# Patient Record
Sex: FEMALE | Race: WHITE | NOT HISPANIC OR LATINO | Employment: FULL TIME
[De-identification: names, ages, dates, MRNs, and addresses within clinical notes are randomized per-mention and may not be internally consistent; named-entity substitution may affect disease eponyms.]

---
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
## 2019-03-26 ENCOUNTER — OFFICE VISIT (OUTPATIENT)
Dept: FAMILY MEDICINE | Facility: CLINIC | Age: 19
End: 2019-03-26
Payer: COMMERCIAL

## 2019-03-26 VITALS
HEIGHT: 72 IN | WEIGHT: 184 LBS | OXYGEN SATURATION: 99 % | TEMPERATURE: 98.9 F | HEART RATE: 89 BPM | BODY MASS INDEX: 24.92 KG/M2 | DIASTOLIC BLOOD PRESSURE: 69 MMHG | SYSTOLIC BLOOD PRESSURE: 116 MMHG

## 2019-03-26 DIAGNOSIS — R63.5 WEIGHT GAIN: Primary | ICD-10-CM

## 2019-03-26 DIAGNOSIS — R82.90 NONSPECIFIC FINDING ON EXAMINATION OF URINE: ICD-10-CM

## 2019-03-26 DIAGNOSIS — N92.1 IRREGULAR INTERMENSTRUAL BLEEDING: ICD-10-CM

## 2019-03-26 DIAGNOSIS — N92.6 IRREGULAR MENSES: ICD-10-CM

## 2019-03-26 DIAGNOSIS — L70.8 OTHER ACNE: ICD-10-CM

## 2019-03-26 DIAGNOSIS — R31.9 URINARY TRACT INFECTION WITH HEMATURIA, SITE UNSPECIFIED: ICD-10-CM

## 2019-03-26 DIAGNOSIS — N39.0 URINARY TRACT INFECTION WITH HEMATURIA, SITE UNSPECIFIED: ICD-10-CM

## 2019-03-26 DIAGNOSIS — R30.0 DYSURIA: ICD-10-CM

## 2019-03-26 LAB
ALBUMIN UR-MCNC: 100 MG/DL
APPEARANCE UR: ABNORMAL
BACTERIA #/AREA URNS HPF: ABNORMAL /HPF
BILIRUB UR QL STRIP: NEGATIVE
COLOR UR AUTO: YELLOW
GLUCOSE UR STRIP-MCNC: NEGATIVE MG/DL
HGB UR QL STRIP: ABNORMAL
KETONES UR STRIP-MCNC: NEGATIVE MG/DL
LEUKOCYTE ESTERASE UR QL STRIP: ABNORMAL
NITRATE UR QL: POSITIVE
NON-SQ EPI CELLS #/AREA URNS LPF: ABNORMAL /LPF
PH UR STRIP: 6.5 PH (ref 5–7)
RBC #/AREA URNS AUTO: >100 /HPF
SOURCE: ABNORMAL
SP GR UR STRIP: >1.03 (ref 1–1.03)
SPECIMEN SOURCE: NORMAL
UROBILINOGEN UR STRIP-ACNC: 1 EU/DL (ref 0.2–1)
WBC #/AREA URNS AUTO: ABNORMAL /HPF
WET PREP SPEC: NORMAL

## 2019-03-26 PROCEDURE — 87088 URINE BACTERIA CULTURE: CPT | Performed by: FAMILY MEDICINE

## 2019-03-26 PROCEDURE — 81001 URINALYSIS AUTO W/SCOPE: CPT | Performed by: FAMILY MEDICINE

## 2019-03-26 PROCEDURE — 84443 ASSAY THYROID STIM HORMONE: CPT | Performed by: FAMILY MEDICINE

## 2019-03-26 PROCEDURE — 36415 COLL VENOUS BLD VENIPUNCTURE: CPT | Performed by: FAMILY MEDICINE

## 2019-03-26 PROCEDURE — 99214 OFFICE O/P EST MOD 30 MIN: CPT | Performed by: FAMILY MEDICINE

## 2019-03-26 PROCEDURE — 87210 SMEAR WET MOUNT SALINE/INK: CPT | Performed by: FAMILY MEDICINE

## 2019-03-26 PROCEDURE — 84439 ASSAY OF FREE THYROXINE: CPT | Performed by: FAMILY MEDICINE

## 2019-03-26 PROCEDURE — 87186 SC STD MICRODIL/AGAR DIL: CPT | Performed by: FAMILY MEDICINE

## 2019-03-26 PROCEDURE — 87086 URINE CULTURE/COLONY COUNT: CPT | Performed by: FAMILY MEDICINE

## 2019-03-26 RX ORDER — NITROFURANTOIN 25; 75 MG/1; MG/1
100 CAPSULE ORAL ONCE
Status: DISCONTINUED | OUTPATIENT
Start: 2019-03-26 | End: 2019-03-26

## 2019-03-26 RX ORDER — ADAPALENE 0.1 G/100G
CREAM TOPICAL AT BEDTIME
Qty: 45 G | Refills: 1 | Status: SHIPPED | OUTPATIENT
Start: 2019-03-26 | End: 2020-11-19

## 2019-03-26 RX ORDER — CEPHALEXIN 500 MG/1
500 CAPSULE ORAL 2 TIMES DAILY
Qty: 60 CAPSULE | Refills: 3 | Status: SHIPPED | OUTPATIENT
Start: 2019-03-26 | End: 2020-11-19

## 2019-03-26 RX ORDER — NITROFURANTOIN 25; 75 MG/1; MG/1
100 CAPSULE ORAL 2 TIMES DAILY
Qty: 14 CAPSULE | Refills: 0 | Status: SHIPPED | OUTPATIENT
Start: 2019-03-26 | End: 2019-04-02

## 2019-03-26 ASSESSMENT — MIFFLIN-ST. JEOR: SCORE: 1708.68

## 2019-03-26 NOTE — PATIENT INSTRUCTIONS
Take medications as directed.  Treatment and  cares discussed   Follow up if problem or concern

## 2019-03-26 NOTE — PROGRESS NOTES
SUBJECTIVE:   Silvia Cullen is a 19 year old female who presents to clinic today for the following health issues:      Concern - Thyroid, Hormones check and Birth control method         Lab test for thyroid disease because of family hx, check hormones due to weight gain, stretch marks     Currently has nexplanon,  inserted 6/2018 but wondering if symptoms could be related to Nexplanon, she is debating removal., because of constant bleeding  She thinks she has gained weight since, has stretch marks etc   Has issue with acne before but feels like it  may have been worse recently bc of hormonal fluctuation .  Has it mostly face but also  Affects chest back etc . Has been  on other med's and topical stuff, not sure if helped much, so feels frustrated        PROBLEMS TO ADD ON...    UTI - Female  Duration of complaint:  1 week   Description:   Painful urination (Dysuria):  Uncomfortable   Blood in urine (Hematuria): no   Delay in urine (Hesitency): no   Intensity: mild  Progression of Symptoms:  same  Accompanying Signs & Symptoms:  Fever/chills: no   Flank pain no   Nausea and vomiting: no   Any vaginal symptoms: vaginal discharge, vaginal odor and vaginal itching  Abdominal/Pelvic Pain: no   History:   History of frequent UTI's: no   History of kidney stones: no   Sexually Active: YES  Possibility of pregnancy: No  Precipitating factors:   Therapies Tried and outcome: nothing           Problem list and histories reviewed & adjusted, as indicated.  Additional history: as documented    There is no problem list on file for this patient.    No past surgical history on file.    Social History     Tobacco Use     Smoking status: Never Smoker     Smokeless tobacco: Never Used   Substance Use Topics     Alcohol use: No     No family history on file.        Reviewed and updated as needed this visit by clinical staff       Reviewed and updated as needed this visit by Provider         ROS:  Constitutional, HEENT, cardiovascular,  "pulmonary, GI, , musculoskeletal, neuro, skin, endocrine and psych systems are negative, except as otherwise noted.    OBJECTIVE:     /69   Pulse 89   Temp 98.9  F (37.2  C) (Tympanic)   Ht 1.816 m (5' 11.5\")   Wt 83.5 kg (184 lb)   SpO2 99%   Breastfeeding? No   BMI 25.31 kg/m    Body mass index is 25.31 kg/m .  GENERAL: healthy, alert and no distress  HENT: oropharynx clear and oral mucous membranes moist  NECK: no adenopathy, no asymmetry, masses, or scars and thyroid normal to palpation  RESP: lungs clear to auscultation - no rales, rhonchi or wheezes  CV: regular rate and rhythm, normal S1 S2, no S3 or S4, no murmur, click or rub, no peripheral edema and peripheral pulses strong  ABDOMEN: soft, nontender, no hepatosplenomegaly, no masses and bowel sounds normal  MS: no edema  SKIN: has fine  Acne lesion on cheeks, chin , forehead although has make up so not too obvious , als some more inflamed and ecthymatous and few pustular lesion on arms/n upper back and neck     Diagnostic Test Results:  Urinalysis - abnormal     ASSESSMENT/PLAN:       (R63.5) Weight gain  Comment: she thinsk it is related to nexplanon but also worried about her thyroid   Plan: TSH, T4, free              (N92.6) Irregular menses  Comment: related to nexplanon   Plan: she plans to go back to discuss option with her gyn       (R30.0) Dysuria  Comment:   Plan: Wet prep, UA reflex to Microscopic and Culture              (L70.8) Other acne  Comment:   Plan: cephALEXin (KEFLEX) 500 MG capsule, adapalene         (DIFFERIN) 0.1 % external cream, SKIN CARE         REFERRAL             Discussed  cares, concerns and treatment options for acne. Willing to start with topical  Differin also keflex . Pros/ cons of treatment and skin cares discussed. Info  given on acne. Follow up recheck in 6-8 weeks, sooner if problem . May see skin care clinic for any ongoing issue if needed       (R82.90) Nonspecific finding on examination of " urine  Comment:   Plan: Urine Culture Aerobic Bacterial            (N39.0,  R31.9) Urinary tract infection with hematuria, site unspecified  Comment:   Plan: nitroFURantoin macrocrystal-monohydrate           nitroFURantoin macrocrystal-monohydrate         (MACROBID) capsule 100 mg              Check labs. refill sent.Cares and  treatment discussed follow. up if problem   Patient expressed understanding and agreement with treatment plan. All patient's questions were answered, will let me know if has more later.  Medications: Rx's: Reviewed the potential side effects/complications of medications prescribed.       Lauren Cochran MD  [facility] [place]

## 2019-03-27 LAB
T4 FREE SERPL-MCNC: 0.95 NG/DL (ref 0.76–1.46)
TSH SERPL DL<=0.005 MIU/L-ACNC: 1.83 MU/L (ref 0.4–4)

## 2019-03-28 LAB
BACTERIA SPEC CULT: ABNORMAL
BACTERIA SPEC CULT: ABNORMAL
SPECIMEN SOURCE: ABNORMAL

## 2019-05-06 ENCOUNTER — APPOINTMENT (OUTPATIENT)
Dept: LAB | Facility: CLINIC | Age: 19
End: 2019-05-06
Payer: COMMERCIAL

## 2019-08-09 ENCOUNTER — OFFICE VISIT (OUTPATIENT)
Dept: FAMILY MEDICINE | Facility: CLINIC | Age: 19
End: 2019-08-09
Payer: COMMERCIAL

## 2019-08-09 VITALS
OXYGEN SATURATION: 98 % | DIASTOLIC BLOOD PRESSURE: 60 MMHG | SYSTOLIC BLOOD PRESSURE: 110 MMHG | BODY MASS INDEX: 24.62 KG/M2 | WEIGHT: 179 LBS | HEART RATE: 78 BPM | TEMPERATURE: 98.1 F

## 2019-08-09 DIAGNOSIS — R07.0 THROAT PAIN: ICD-10-CM

## 2019-08-09 DIAGNOSIS — J02.0 ACUTE STREPTOCOCCAL PHARYNGITIS: Primary | ICD-10-CM

## 2019-08-09 LAB
DEPRECATED S PYO AG THROAT QL EIA: NORMAL
SPECIMEN SOURCE: NORMAL

## 2019-08-09 PROCEDURE — 99213 OFFICE O/P EST LOW 20 MIN: CPT | Performed by: NURSE PRACTITIONER

## 2019-08-09 PROCEDURE — 87081 CULTURE SCREEN ONLY: CPT | Performed by: NURSE PRACTITIONER

## 2019-08-09 PROCEDURE — 87880 STREP A ASSAY W/OPTIC: CPT | Performed by: NURSE PRACTITIONER

## 2019-08-09 RX ORDER — PENICILLIN V POTASSIUM 500 MG/1
500 TABLET, FILM COATED ORAL 2 TIMES DAILY
Qty: 20 TABLET | Refills: 0 | Status: SHIPPED | OUTPATIENT
Start: 2019-08-09 | End: 2019-08-19

## 2019-08-09 NOTE — PATIENT INSTRUCTIONS
Fill Rx if your recovery fails to progress.     Let me know if you need anything else.    Lozenges, sprays, honey, tea, salt water gargles.     Ditch your toothbrush

## 2019-08-09 NOTE — PROGRESS NOTES
Subjective     Silvia Cullen is a 19 year old female who presents to clinic today for the following health issues:    HPI   Acute Illness   Acute illness concerns: sore throat   Onset: 4 days     Fever: no    Chills/Sweats: YES    Headache (location?): YES    Sinus Pressure:no    Conjunctivitis:  no    Ear Pain: YES: right    Rhinorrhea: no    Congestion: no    Sore Throat: YES     Cough: no    Wheeze: no    Decreased Appetite: no    Nausea: no    Vomiting: no    Diarrhea:  no    Dysuria/Freq.: no    Fatigue/Achiness: YES    Sick/Strep Exposure: YES- mom tested positive for strep yesterday      Therapies Tried and outcome: none     HPI: Silvia presents today with the complaint of sore throat, chills, ear pain, and fatigue for the past four days. Getting slightly better today, she noted. Her mother was diagnosed with strep pharyngitis yesterday.     Patient Active Problem List   Diagnosis     Other acne     History reviewed. No pertinent surgical history.    Social History     Tobacco Use     Smoking status: Never Smoker     Smokeless tobacco: Never Used   Substance Use Topics     Alcohol use: No     History reviewed. No pertinent family history.        Reviewed and updated as needed this visit by Provider  Tobacco  Allergies  Meds  Problems  Med Hx  Surg Hx  Fam Hx         Review of Systems   ROS COMP: Constitutional, HEENT, pulmonary systems are negative, except as otherwise noted.      Objective    /60 (BP Location: Right arm, Patient Position: Chair, Cuff Size: Adult Regular)   Pulse 78   Temp 98.1  F (36.7  C) (Tympanic)   Wt 81.2 kg (179 lb)   LMP 07/19/2019   SpO2 98%   BMI 24.62 kg/m    Body mass index is 24.62 kg/m .  Physical Exam   GENERAL: healthy, alert and no distress  HENT: ear canals and TM's normal, nose without ulcers or lesions; Mouth- markedly edematous and erythematous posterior oropharynx with patchy white exudate noted over back wall and pillars.   NECK: no adenopathy, no  "asymmetry, masses, or scars and thyroid normal to palpation  RESP: lungs clear to auscultation - no rales, rhonchi or wheezes  CV: regular rate and rhythm, normal S1 S2, no S3 or S4, no murmur, click or rub, no peripheral edema and peripheral pulses strong  NEURO: Normal strength and tone, mentation intact and speech normal    Diagnostic Test Results:  Results for orders placed or performed in visit on 08/09/19 (from the past 24 hour(s))   Strep, Rapid Screen   Result Value Ref Range    Specimen Description Throat     Rapid Strep A Screen       NEGATIVE: No Group A streptococcal antigen detected by immunoassay, await culture report.           Assessment & Plan     Silvia was seen today for throat problem. Rapid strep negative. However, given that she and her mother (who has documented strep throat) developed similar symptoms simultaneously (in combination with the appearance of her throat today), I feel like it is reasonable to provide a written prescription for penicillin V to be filled in the event that her symptoms fail to resolve over the weekend. She agrees to hold off on filling this if she continues to improve. Discussed reasons to call or return to clinic. Silvia acknowledges and demonstrates understanding of circumstances under which care should be sought urgently or emergently. Follow up as discussed. Discussed risks, benefits, alternatives, potential side effects, and proper administration of new medication / treatment. Agrees with plan of care. All questions answered.     Diagnoses and all orders for this visit:    Acute streptococcal pharyngitis  -     penicillin V (VEETID) 500 MG tablet; Take 1 tablet (500 mg) by mouth 2 times daily for 10 days    Throat pain  -     Strep, Rapid Screen  -     Beta strep group A culture         BMI:   Estimated body mass index is 24.62 kg/m  as calculated from the following:    Height as of 3/26/19: 1.816 m (5' 11.5\").    Weight as of this encounter: 81.2 kg (179 lb). "           See Patient Instructions    Return in about 10 days (around 8/19/2019).    Jose Francisco Roas NP  [facility] [place]

## 2019-08-10 LAB
BACTERIA SPEC CULT: NORMAL
SPECIMEN SOURCE: NORMAL

## 2020-03-10 ENCOUNTER — HEALTH MAINTENANCE LETTER (OUTPATIENT)
Age: 20
End: 2020-03-10

## 2020-09-29 ENCOUNTER — MYC MEDICAL ADVICE (OUTPATIENT)
Dept: FAMILY MEDICINE | Facility: CLINIC | Age: 20
End: 2020-09-29

## 2020-09-29 ENCOUNTER — OFFICE VISIT (OUTPATIENT)
Dept: FAMILY MEDICINE | Facility: CLINIC | Age: 20
End: 2020-09-29
Payer: COMMERCIAL

## 2020-09-29 VITALS
TEMPERATURE: 97.5 F | DIASTOLIC BLOOD PRESSURE: 60 MMHG | SYSTOLIC BLOOD PRESSURE: 110 MMHG | BODY MASS INDEX: 26.14 KG/M2 | HEIGHT: 72 IN | WEIGHT: 193 LBS | HEART RATE: 72 BPM

## 2020-09-29 DIAGNOSIS — Z00.00 ROUTINE GENERAL MEDICAL EXAMINATION AT A HEALTH CARE FACILITY: Primary | ICD-10-CM

## 2020-09-29 DIAGNOSIS — Z11.1 SCREENING EXAMINATION FOR PULMONARY TUBERCULOSIS: ICD-10-CM

## 2020-09-29 DIAGNOSIS — Z11.8 SCREENING FOR CHLAMYDIAL DISEASE: ICD-10-CM

## 2020-09-29 DIAGNOSIS — Z23 NEED FOR PROPHYLACTIC VACCINATION AND INOCULATION AGAINST INFLUENZA: ICD-10-CM

## 2020-09-29 DIAGNOSIS — Z01.84 IMMUNITY STATUS TESTING: ICD-10-CM

## 2020-09-29 PROCEDURE — 86787 VARICELLA-ZOSTER ANTIBODY: CPT | Performed by: NURSE PRACTITIONER

## 2020-09-29 PROCEDURE — 87491 CHLMYD TRACH DNA AMP PROBE: CPT | Performed by: NURSE PRACTITIONER

## 2020-09-29 PROCEDURE — 86481 TB AG RESPONSE T-CELL SUSP: CPT | Performed by: NURSE PRACTITIONER

## 2020-09-29 PROCEDURE — 99395 PREV VISIT EST AGE 18-39: CPT | Mod: 25 | Performed by: NURSE PRACTITIONER

## 2020-09-29 PROCEDURE — 36415 COLL VENOUS BLD VENIPUNCTURE: CPT | Performed by: NURSE PRACTITIONER

## 2020-09-29 PROCEDURE — 90686 IIV4 VACC NO PRSV 0.5 ML IM: CPT | Performed by: NURSE PRACTITIONER

## 2020-09-29 PROCEDURE — 90471 IMMUNIZATION ADMIN: CPT | Performed by: NURSE PRACTITIONER

## 2020-09-29 RX ORDER — ETONOGESTREL AND ETHINYL ESTRADIOL .12; .015 MG/D; MG/D
RING VAGINAL
COMMUNITY
Start: 2020-08-26

## 2020-09-29 ASSESSMENT — MIFFLIN-ST. JEOR: SCORE: 1752.44

## 2020-09-29 NOTE — TELEPHONE ENCOUNTER
Printed and placed in MA folder to add to chart.    .Ritika SILVA    [facility] [place]

## 2020-09-29 NOTE — PATIENT INSTRUCTIONS
Preventive Health Recommendations  Female Ages 21 to 25     Yearly exam:     See your health care provider every year in order to  o Review health changes.   o Discuss preventive care.    o Review your medicines if your doctor has prescribed any.      You should be tested each year for STDs (sexually transmitted diseases).       Talk to your provider about how often you should have cholesterol testing.      Get a Pap test every three years. If you have an abnormal result, your doctor may have you test more often.      If you are at risk for diabetes, you should have a diabetes test (fasting glucose).     Shots:     Get a flu shot each year.     Get a tetanus shot every 10 years.     Consider getting the shot (vaccine) that prevents cervical cancer (Gardasil).    Nutrition:     Eat at least 5 servings of fruits and vegetables each day.    Eat whole-grain bread, whole-wheat pasta and brown rice instead of white grains and rice.    Get adequate Calcium and Vitamin D.     Lifestyle    Exercise at least 150 minutes a week each week (30 minutes a day, 5 days a week). This will help you control your weight and prevent disease.    Limit alcohol to one drink per day.    No smoking.     Wear sunscreen to prevent skin cancer.    See your dentist every six months for an exam and cleaning.        https://www.ibsdiets.org/fodmap-diet/fodmap-food-list/

## 2020-09-29 NOTE — TELEPHONE ENCOUNTER
Printed and placed in provider in-basket.    .Ritika SILVA    [facility] [place]

## 2020-09-29 NOTE — PROGRESS NOTES
SUBJECTIVE:   CC: Silvia Cullen is an 20 year old woman who presents for preventive health visit.       Patient has been advised of split billing requirements and indicates understanding: Yes  Healthy Habits:    Do you get at least three servings of calcium containing foods daily (dairy, green leafy vegetables, etc.)? yes    Amount of exercise or daily activities, outside of work: 3 day(s) per week    Problems taking medications regularly No    Medication side effects: No    Have you had an eye exam in the past two years? yes    Do you see a dentist twice per year? yes    Do you have sleep apnea, excessive snoring or daytime drowsiness?no      PROBLEMS TO ADD ON... mantoux test (blood test b/c she had the vaccine) , varicella antibodies and flu     Today's PHQ-2 Score:   PHQ-2 ( 1999 Pfizer) 9/29/2020   Q1: Little interest or pleasure in doing things 0   Q2: Feeling down, depressed or hopeless 0   PHQ-2 Score 0       Abuse: Current or Past(Physical, Sexual or Emotional)- NO  Do you feel safe in your environment? YES        Social History     Tobacco Use     Smoking status: Never Smoker     Smokeless tobacco: Never Used   Substance Use Topics     Alcohol use: Yes     If you drink alcohol do you typically have >3 drinks per day or >7 drinks per week? No                     Reviewed orders with patient.  Reviewed health maintenance and updated orders accordingly - Yes  Lab work is in process    Mammogram not appropriate for this patient based on age.    Pertinent mammograms are reviewed under the imaging tab.  History of abnormal Pap smear: NO - age 21-29 PAP every 3 years recommended     Reviewed and updated as needed this visit by clinical staff  Tobacco  Allergies  Meds  Problems  Med Hx  Surg Hx  Fam Hx  Soc Hx          Reviewed and updated as needed this visit by Provider  Tobacco  Allergies  Meds  Problems  Med Hx  Surg Hx  Fam Hx            ROS:  CONSTITUTIONAL: NEGATIVE for fever, chills, change  in weight  INTEGUMENTARU/SKIN: NEGATIVE for worrisome rashes, moles or lesions  EYES: NEGATIVE for vision changes or irritation  ENT: NEGATIVE for ear, mouth and throat problems  RESP: NEGATIVE for significant cough or SOB  BREAST: NEGATIVE for masses, tenderness or discharge  CV: NEGATIVE for chest pain, palpitations or peripheral edema  GI: NEGATIVE for nausea, abdominal pain, heartburn, or change in bowel habits  : NEGATIVE for unusual urinary or vaginal symptoms. Periods are regular.  MUSCULOSKELETAL: NEGATIVE for significant arthralgias or myalgia  NEURO: NEGATIVE for weakness, dizziness or paresthesias  PSYCHIATRIC: NEGATIVE for changes in mood or affect    OBJECTIVE:   /60 (BP Location: Right arm, Patient Position: Chair, Cuff Size: Adult Regular)   Pulse 72   Temp 97.5  F (36.4  C) (Tympanic)   Ht 1.829 m (6')   Wt 87.5 kg (193 lb)   LMP 09/21/2020   BMI 26.18 kg/m    EXAM:  GENERAL: healthy, alert and no distress  EYES: Eyes grossly normal to inspection, PERRL and conjunctivae and sclerae normal  HENT: ear canals and TM's normal, nose and mouth without ulcers or lesions  NECK: no adenopathy, no asymmetry, masses, or scars and thyroid normal to palpation  RESP: lungs clear to auscultation - no rales, rhonchi or wheezes  BREAST: normal without masses, tenderness or nipple discharge and no palpable axillary masses or adenopathy  CV: regular rate and rhythm, normal S1 S2, no S3 or S4, no murmur, click or rub, no peripheral edema and peripheral pulses strong  ABDOMEN: soft, nontender, no hepatosplenomegaly, no masses and bowel sounds normal  MS: no gross musculoskeletal defects noted, no edema  SKIN: no suspicious lesions or rashes  NEURO: Normal strength and tone, mentation intact and speech normal  PSYCH: mentation appears normal, affect normal/bright    Diagnostic Test Results:  Labs reviewed in Epic    ASSESSMENT/PLAN:   Silvia was seen today for physical and imm/inj.    Diagnoses and all orders  for this visit:    Routine general medical examination at a health care facility    Screening examination for pulmonary tuberculosis  -     Quantiferon TB Gold Plus    Immunity status testing  -     Varicella Zoster Virus Antibody IgG    Screening for chlamydial disease  -     CHLAMYDIA TRACHOMATIS PCR    Need for prophylactic vaccination and inoculation against influenza  -     INFLUENZA VACCINE IM > 6 MONTHS VALENT IIV4 [08237]  -     Vaccine Administration, Initial [[id number]]        Patient has been advised of split billing requirements and indicates understanding: Yes  COUNSELING:   Reviewed preventive health counseling, as reflected in patient instructions    Estimated body mass index is 26.18 kg/m  as calculated from the following:    Height as of this encounter: 1.829 m (6').    Weight as of this encounter: 87.5 kg (193 lb).    Weight management plan: Discussed healthy diet and exercise guidelines    She reports that she has never smoked. She has never used smokeless tobacco.      Counseling Resources:  ATP IV Guidelines  Pooled Cohorts Equation Calculator  Breast Cancer Risk Calculator  BRCA-Related Cancer Risk Assessment: FHS-7 Tool  FRAX Risk Assessment  ICSI Preventive Guidelines  Dietary Guidelines for Americans, 2010  USDA's MyPlate  ASA Prophylaxis  Lung CA Screening    Jose Francisco Rosa NP  [facility] [place]

## 2020-09-30 LAB
C TRACH DNA SPEC QL NAA+PROBE: NEGATIVE
GAMMA INTERFERON BACKGROUND BLD IA-ACNC: 0.03 IU/ML
M TB IFN-G CD4+ BCKGRND COR BLD-ACNC: 9.97 IU/ML
M TB TUBERC IFN-G BLD QL: NEGATIVE
MITOGEN IGNF BCKGRD COR BLD-ACNC: 0 IU/ML
MITOGEN IGNF BCKGRD COR BLD-ACNC: 0 IU/ML
SPECIMEN SOURCE: NORMAL
VZV IGG SER QL IA: 2.9 AI (ref 0–0.8)

## 2020-09-30 NOTE — TELEPHONE ENCOUNTER
Brought to the  for patient to , per patient request. Mychart sent to patient.    .Ritika SILVA    [facility]

## 2020-09-30 NOTE — TELEPHONE ENCOUNTER
Form has been completed, immunizations have been abstracted into chart and scanned to medical records. Silent Communicationhart sent to patient.    .Ritika SILVA    [facility] [place]

## 2020-11-19 ENCOUNTER — OFFICE VISIT (OUTPATIENT)
Dept: FAMILY MEDICINE | Facility: CLINIC | Age: 20
End: 2020-11-19
Payer: COMMERCIAL

## 2020-11-19 VITALS
HEIGHT: 72 IN | BODY MASS INDEX: 26.41 KG/M2 | OXYGEN SATURATION: 100 % | WEIGHT: 195 LBS | TEMPERATURE: 97.6 F | DIASTOLIC BLOOD PRESSURE: 76 MMHG | SYSTOLIC BLOOD PRESSURE: 110 MMHG | HEART RATE: 89 BPM

## 2020-11-19 DIAGNOSIS — R19.8 ALTERNATING CONSTIPATION AND DIARRHEA: ICD-10-CM

## 2020-11-19 DIAGNOSIS — R19.8 GASTROINTESTINAL SYMPTOMS: Primary | ICD-10-CM

## 2020-11-19 PROCEDURE — 36415 COLL VENOUS BLD VENIPUNCTURE: CPT | Performed by: NURSE PRACTITIONER

## 2020-11-19 PROCEDURE — 82784 ASSAY IGA/IGD/IGG/IGM EACH: CPT | Performed by: NURSE PRACTITIONER

## 2020-11-19 PROCEDURE — 83516 IMMUNOASSAY NONANTIBODY: CPT | Mod: 59 | Performed by: NURSE PRACTITIONER

## 2020-11-19 PROCEDURE — 82785 ASSAY OF IGE: CPT | Performed by: NURSE PRACTITIONER

## 2020-11-19 PROCEDURE — 83516 IMMUNOASSAY NONANTIBODY: CPT | Performed by: NURSE PRACTITIONER

## 2020-11-19 PROCEDURE — 99214 OFFICE O/P EST MOD 30 MIN: CPT | Performed by: NURSE PRACTITIONER

## 2020-11-19 PROCEDURE — 86003 ALLG SPEC IGE CRUDE XTRC EA: CPT | Performed by: NURSE PRACTITIONER

## 2020-11-19 ASSESSMENT — MIFFLIN-ST. JEOR: SCORE: 1761.51

## 2020-11-19 NOTE — PROGRESS NOTES
Subjective     Silvia Cullen is a 20 year old female who presents to clinic today for the following health issues:    HPI         GI problem   Onset/Duration: x ongoing   Description: diarrhea and or constipation   Intensity: mild, moderate  Progression of Symptoms: improving with recommendation   Accompanying Signs & Symptoms:  constipation: YES  Diarrhea:    Yes   Nausea: no  Vomiting (bloody?): no  Abdominal Pain: YES  Black-Tarry stools: no  Bloody stools: no  History:  Previous similar episodes: YES  Previous ulcers: no  Precipitating factors:   Caffeine use: YES  Alcohol use: no  NSAID/Aspirin use: no.  Alleviating factors: None  Therapies tried and outcome:             Lifestyle changes: yes            Medications: none    HPI: At her last physical, Silvia related that she had long-struggled with abdominal bloating, abdominal pain, and alternating diarrhea and constipation. Her mother told her that she had struggled with these issues even in early childhood (hives and other symptoms when given certain baby foods, etc.). At her physical, we discussed potential IBS and she attempted to modify her diet. She does note some improvement, but she finds that she still develops her worst symptoms after eating pastas, breads, etc. No upper GI complaints. No urinary complaints. No hematochezia or melena.     Review of Systems   Constitutional, GI,  systems are negative, except as otherwise noted.      Objective    /76   Pulse 89   Temp 97.6  F (36.4  C) (Tympanic)   Ht 1.829 m (6')   Wt 88.5 kg (195 lb)   LMP 10/19/2020   SpO2 100%   BMI 26.45 kg/m    Body mass index is 26.45 kg/m .  Physical Exam   GENERAL: healthy, alert and no distress  RESP: lungs clear to auscultation - no rales, rhonchi or wheezes  CV: regular rate and rhythm, normal S1 S2, no S3 or S4, no murmur, click or rub, no peripheral edema and peripheral pulses strong  ABDOMEN: soft, nontender, no hepatosplenomegaly, no masses and bowel sounds  normal  NEURO: Normal strength and tone, mentation intact and speech normal  PSYCH: mentation appears normal, affect normal/bright    No results found for this or any previous visit (from the past 24 hour(s)).        Assessment & Plan     Silvia was seen today for gastrointestinal problem. Possibly an organic etiology given failure of low fodmap diet and failure of elimination diets. Will check for Celiac today, as well as common food allergies. Will follow up with her after I see the results. Will send to GI if these are unremarkable. Will send to allergist if food allergy panel indicates multiple allergies.    Diagnoses and all orders for this visit:    Gastrointestinal symptoms  -     Tissue transglutaminase french IgA and IgG  -     IgA  -     Allergy adult food panel    Alternating constipation and diarrhea  -     Tissue transglutaminase french IgA and IgG  -     IgA  -     Allergy adult food panel            See Patient Instructions    Return in about 4 weeks (around 12/17/2020) for persistent or worsening symptoms.    Jose Francisco Rosa NP  [facility]

## 2020-11-20 LAB — IGA SERPL-MCNC: 179 MG/DL (ref 84–499)

## 2020-11-24 LAB
ALMOND IGE QN: <0.1 KU(A)/L
CASHEW NUT IGE QN: <0.1 KU(A)/L
CODFISH IGE QN: <0.1 KU(A)/L
COW MILK IGE QN: <0.1 KU(A)/L
EGG WHITE IGE QN: <0.1 KU(A)/L
HAZELNUT IGE QN: <0.1 KU(A)/L
IGE SERPL-ACNC: 19 KIU/L (ref 0–114)
PEANUT IGE QN: <0.1 KU(A)/L
SALMON IGE QN: <0.1 KU(A)/L
SCALLOP IGE QN: <0.1 KU(A)/L
SESAME SEED IGE QN: <0.1 KU(A)/L
SHRIMP IGE QN: <0.1 KU(A)/L
SOYBEAN IGE QN: <0.1 KU(A)/L
TTG IGA SER-ACNC: 1 U/ML
TTG IGG SER-ACNC: <1 U/ML
TUNA IGE QN: <0.1 KU(A)/L
WALNUT IGE QN: <0.1 KU(A)/L
WHEAT IGE QN: <0.1 KU(A)/L

## 2020-11-27 NOTE — TELEPHONE ENCOUNTER
REFERRAL INFORMATION:    Referring Provider:  Dr. Jose Francisco Rosa     Referring Clinic:  [facility]     Reason for Visit/Diagnosis: Constipation and Diarrhea      FUTURE VISIT INFORMATION:    Appointment Date: 12/22/2020    Appointment Time: 1:20 PM     NOTES STATUS DETAILS   OFFICE NOTE from Referring Provider Internal 11/19/2020 Office visit with Dr. Rosa    OFFICE NOTE from Other Specialist N/A    HOSPITAL DISCHARGE SUMMARY/  ED VISITS N/A    OPERATIVE REPORT N/A    MEDICATION LIST Care Everywhere         ENDOSCOPY  N/A    COLONOSCOPY N/A    ERCP N/A    EUS N/A    STOOL TESTING N/A    PERTINENT LABS Internal    PATHOLOGY REPORTS (RELATED) N/A    IMAGING (CT, MRI, EGD, MRCP, Small Bowel Follow Through/SBT, MR/CT Enterography) N/A

## 2020-12-22 ENCOUNTER — VIRTUAL VISIT (OUTPATIENT)
Dept: GASTROENTEROLOGY | Facility: CLINIC | Age: 20
End: 2020-12-22
Payer: COMMERCIAL

## 2020-12-22 ENCOUNTER — PRE VISIT (OUTPATIENT)
Dept: GASTROENTEROLOGY | Facility: CLINIC | Age: 20
End: 2020-12-22

## 2020-12-22 VITALS — HEIGHT: 72 IN | WEIGHT: 195 LBS | BODY MASS INDEX: 26.41 KG/M2

## 2020-12-22 DIAGNOSIS — K58.1 IRRITABLE BOWEL SYNDROME WITH CONSTIPATION: Primary | ICD-10-CM

## 2020-12-22 PROCEDURE — 99203 OFFICE O/P NEW LOW 30 MIN: CPT | Mod: 95 | Performed by: INTERNAL MEDICINE

## 2020-12-22 ASSESSMENT — MIFFLIN-ST. JEOR: SCORE: 1761.51

## 2020-12-22 ASSESSMENT — PAIN SCALES - GENERAL: PAINLEVEL: NO PAIN (0)

## 2020-12-22 NOTE — LETTER
"  12/22/2020      RE: Silvia Cullen  [address]  [place] [address]      Dear Colleague,    Thank you for referring your patient, Silvia Cullen, to the [facility] [place]. Please see a copy of my visit note below.    Silvia Cullen is a 20 year old female who is being evaluated via a billable video visit.      The patient has been notified of following:     \"This video visit will be conducted via a call between you and your physician/provider. We have found that certain health care needs can be provided without the need for an in-person physical exam.  This service lets us provide the care you need with a video conversation.  If a prescription is necessary we can send it directly to your pharmacy.  If lab work is needed we can place an order for that and you can then stop by our lab to have the test done at a later time.    Video visits are billed at different rates depending on your insurance coverage.  Please reach out to your insurance provider with any questions.    If during the course of the call the physician/provider feels a video visit is not appropriate, you will not be charged for this service.\"    Patient has given verbal consent for Video visit? Yes  How would you like to obtain your AVS? MyChart  If you are dropped from the video visit, the video invite should be resent to: Text to cell phone: [phone]  Will anyone else be joining your video visit? No      Referring provider: Jose Francisco Rosa NP    CC: 20 year old female referred by Jose Francisco Rosa NP for evaluation of bloating and constipation.    HPI:   21 yo woman who presents for bloating and constipation.     Can go 3 days without a BM, then has 1 BM. Milk and cheese will cause diarrhea. Bloating improves with defecation. Gets abdominal discomfort at umbilicus when bloated. Has not taken anything for constipation. Occasionally uses probiotics. Weight stable. No blood or melena. Occasional nausea. No " vomiting. Symptoms present for years, although changed about 6-12 months ago- used to have more nausea and vomiting, now more issues with constipation then diarrhea.    A 10 point ROS is otherwise negative.    PMH:  Previously    PSH:  No abdominal surgeries.    Family History   Problem Relation Age of Onset     Crohn's Disease No family hx of      Ulcerative Colitis No family hx of      GERD No family hx of      Stomach Cancer No family hx of    No colon cancer.    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     Current Outpatient Medications   Medication     ELURYNG 0.12-0.015 MG/24HR vaginal ring     No current facility-administered medications for this visit.    Occasional probiotics. No other supplements or OTC.     Physical exam:  GEN: NAD  Eyes: EOMI  Ears: hearing intact  Mouth: MMM  Neck: full ROM  Cardiopulmonary: non labored  Skin: no jaundice  Neuro: awake and oriented  MSK: moves arms equally  Psych: normal affect     Labs:   TTG negative    Imaging:   No pertinent imaging.     Endoscopy:  No history of endoscopy.    Assessment and recommendations:   21 yo woman who presents for bloating and constipation.     Consistent with irritable bowel syndrome- constipation > mixed.   No red flag signs or symptoms. Bloating and discomfort improves with defecation.     - go to lab  - start polyethylene glycol (Miralax) 17g per day  - consider trying enteric coated peppermint oil (IBGard) 1 capsule once per day x2 weeks- if helpful you can take this long term  - consider trying psyllium fiber (Metamucil) one tsp per day    RTC 3 months      Video-Visit Details    Type of service:  Video Visit    Video Start Time: 1:41 PM  Video End Time: 1:56 PM    Originating Location (pt. Location): Home    Distant Location (provider location):  M  [facility] [place]     Platform used for Video Visit: Dena Adair MD    [facility]  Division of Gastroenterology  [phone]

## 2020-12-22 NOTE — PATIENT INSTRUCTIONS
It was a pleasure taking care of you today.  I've included a brief summary of our discussion and care plan from today's visit below.  Please review this information with your primary care provider.  _______________________________________________________________________    My recommendations are summarized as follows:  - go to lab  - start polyethylene glycol (Miralax) 17g per day  - consider trying enteric coated peppermint oil (IBGard) 1 capsule once per day x2 weeks- if helpful you can take this long term  - consider trying psyllium fiber (Metamucil) one tsp per day      Please call our nurse Patricia with any questions or concerns- [phone].  --    Return to GI Clinic in 3 months to review your progress.    _______________________________________________________________________    Who do I call with any questions after my visit?  Please be in touch if there are any further questions that arise following today's visit.  There are multiple ways to contact your gastroenterology care team.        During business hours, you may reach a Gastroenterology nurse at [phone] and choose option 3.         To schedule or reschedule an appointment, please call [phone].       You can always send a secure message through Acamica.  Acamica messages are answered by your nurse or doctor typically within 24 hours.  Please allow extra time on weekends and holidays.        For urgent/emergent questions after business hours, you may reach the on-call GI Fellow by contacting the [facility] at [phone].     How will I get the results of any tests ordered?    You will receive all of your results.  If you have signed up for Acamica, any tests ordered at your visit will be available to you after your physician reviews them.  Typically this takes 1-2 weeks.  If there are urgent results that require a change in your care plan, your physician or nurse will call you to discuss the next steps.      What is  ClarityRay?  ClarityRay is a secure way for you to access all of your healthcare records from the [facility].  It is a web based computer program, so you can sign on to it from any location.  It also allows you to send secure messages to your care team.  I recommend signing up for ClarityRay access if you have not already done so and are comfortable with using a computer.      How to I schedule a follow-up visit?  If you did not schedule a follow-up visit today, please call [phone] to schedule a follow-up office visit.        Sincerely,    Thomas Adair MD     [facility]  Division of Gastroenterology

## 2020-12-22 NOTE — PROGRESS NOTES
"Silvia Cullen is a 20 year old female who is being evaluated via a billable video visit.      The patient has been notified of following:     \"This video visit will be conducted via a call between you and your physician/provider. We have found that certain health care needs can be provided without the need for an in-person physical exam.  This service lets us provide the care you need with a video conversation.  If a prescription is necessary we can send it directly to your pharmacy.  If lab work is needed we can place an order for that and you can then stop by our lab to have the test done at a later time.    Video visits are billed at different rates depending on your insurance coverage.  Please reach out to your insurance provider with any questions.    If during the course of the call the physician/provider feels a video visit is not appropriate, you will not be charged for this service.\"    Patient has given verbal consent for Video visit? Yes  How would you like to obtain your AVS? MyChart  If you are dropped from the video visit, the video invite should be resent to: Text to cell phone: [phone]  Will anyone else be joining your video visit? No      Referring provider: Jose Francisco Rosa NP    CC: 20 year old female referred by Jose Francisco Rosa NP for evaluation of bloating and constipation.    HPI:   21 yo woman who presents for bloating and constipation.     Can go 3 days without a BM, then has 1 BM. Milk and cheese will cause diarrhea. Bloating improves with defecation. Gets abdominal discomfort at umbilicus when bloated. Has not taken anything for constipation. Occasionally uses probiotics. Weight stable. No blood or melena. Occasional nausea. No vomiting. Symptoms present for years, although changed about 6-12 months ago- used to have more nausea and vomiting, now more issues with constipation then diarrhea.    A 10 point ROS is otherwise negative.    PMH:  Previously    PSH:  No abdominal " surgeries.    Family History   Problem Relation Age of Onset     Crohn's Disease No family hx of      Ulcerative Colitis No family hx of      GERD No family hx of      Stomach Cancer No family hx of    No colon cancer.    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     Current Outpatient Medications   Medication     ELURYNG 0.12-0.015 MG/24HR vaginal ring     No current facility-administered medications for this visit.    Occasional probiotics. No other supplements or OTC.     Physical exam:  GEN: NAD  Eyes: EOMI  Ears: hearing intact  Mouth: MMM  Neck: full ROM  Cardiopulmonary: non labored  Skin: no jaundice  Neuro: awake and oriented  MSK: moves arms equally  Psych: normal affect     Labs:   TTG negative    Imaging:   No pertinent imaging.     Endoscopy:  No history of endoscopy.    Assessment and recommendations:   21 yo woman who presents for bloating and constipation.     Consistent with irritable bowel syndrome- constipation > mixed.   No red flag signs or symptoms. Bloating and discomfort improves with defecation.     - go to lab  - start polyethylene glycol (Miralax) 17g per day  - consider trying enteric coated peppermint oil (IBGard) 1 capsule once per day x2 weeks- if helpful you can take this long term  - consider trying psyllium fiber (Metamucil) one tsp per day    RTC 3 months    Video-Visit Details    Type of service:  Video Visit    Video Start Time: 1:41 PM  Video End Time: 1:56 PM    Originating Location (pt. Location): Home    Distant Location (provider location):  [facility] [place]     Platform used for Video Visit: Dena Adair MD    [facility]  Division of Gastroenterology  [phone]

## 2020-12-22 NOTE — NURSING NOTE
Chief Complaint   Patient presents with     New Patient     constipation and diarrhea       Vitals:    12/22/20 1243   Weight: 88.5 kg (195 lb)   Height: 1.829 m (6')       Body mass index is 26.45 kg/m .      Cyril Lane LPN

## 2021-04-05 ENCOUNTER — OFFICE VISIT (OUTPATIENT)
Dept: FAMILY MEDICINE | Facility: CLINIC | Age: 21
End: 2021-04-05
Payer: COMMERCIAL

## 2021-04-05 VITALS
HEIGHT: 72 IN | DIASTOLIC BLOOD PRESSURE: 72 MMHG | SYSTOLIC BLOOD PRESSURE: 118 MMHG | OXYGEN SATURATION: 96 % | TEMPERATURE: 98.5 F | BODY MASS INDEX: 26.45 KG/M2 | HEART RATE: 100 BPM

## 2021-04-05 DIAGNOSIS — R05.9 COUGH: ICD-10-CM

## 2021-04-05 DIAGNOSIS — B37.9 ANTIBIOTIC-INDUCED YEAST INFECTION: ICD-10-CM

## 2021-04-05 DIAGNOSIS — T36.95XA ANTIBIOTIC-INDUCED YEAST INFECTION: ICD-10-CM

## 2021-04-05 DIAGNOSIS — R50.9 FEVER, UNSPECIFIED FEVER CAUSE: ICD-10-CM

## 2021-04-05 DIAGNOSIS — R52 BODY ACHES: ICD-10-CM

## 2021-04-05 DIAGNOSIS — J01.90 ACUTE SINUSITIS WITH SYMPTOMS > 10 DAYS: Primary | ICD-10-CM

## 2021-04-05 DIAGNOSIS — R09.81 CONGESTION OF PARANASAL SINUS: ICD-10-CM

## 2021-04-05 PROCEDURE — U0003 INFECTIOUS AGENT DETECTION BY NUCLEIC ACID (DNA OR RNA); SEVERE ACUTE RESPIRATORY SYNDROME CORONAVIRUS 2 (SARS-COV-2) (CORONAVIRUS DISEASE [COVID-19]), AMPLIFIED PROBE TECHNIQUE, MAKING USE OF HIGH THROUGHPUT TECHNOLOGIES AS DESCRIBED BY CMS-2020-01-R: HCPCS | Performed by: NURSE PRACTITIONER

## 2021-04-05 PROCEDURE — U0005 INFEC AGEN DETEC AMPLI PROBE: HCPCS | Performed by: NURSE PRACTITIONER

## 2021-04-05 PROCEDURE — 99213 OFFICE O/P EST LOW 20 MIN: CPT | Performed by: NURSE PRACTITIONER

## 2021-04-05 RX ORDER — FLUCONAZOLE 150 MG/1
150 TABLET ORAL ONCE
Qty: 1 TABLET | Refills: 0 | Status: SHIPPED | OUTPATIENT
Start: 2021-04-05 | End: 2021-04-05

## 2021-04-05 RX ORDER — BENZONATATE 200 MG/1
200 CAPSULE ORAL 3 TIMES DAILY PRN
Qty: 18 CAPSULE | Refills: 1 | Status: SHIPPED | OUTPATIENT
Start: 2021-04-05 | End: 2021-08-17

## 2021-04-05 NOTE — PROGRESS NOTES
Assessment & Plan     Acute sinusitis with symptoms > 10 days    - amoxicillin-clavulanate (AUGMENTIN) 875-125 MG tablet  Dispense: 14 tablet; Refill: 0    Antibiotic-induced yeast infection    - fluconazole (DIFLUCAN) 150 MG tablet  Dispense: 1 tablet; Refill: 0    Cough    - Symptomatic COVID-19 Virus (Coronavirus) by PCR  - benzonatate (TESSALON) 200 MG capsule  Dispense: 18 capsule; Refill: 1    Fever, unspecified fever cause    - Symptomatic COVID-19 Virus (Coronavirus) by PCR    Congestion of paranasal sinus    - Symptomatic COVID-19 Virus (Coronavirus) by PCR    Body aches    - Symptomatic COVID-19 Virus (Coronavirus) by PCR      Comment: Vitally-stable, afebrile, non-toxic in appearance, and with good oxygenation today. No red flags to prompt suspicion for pneumonia or other potentially-invasive respiratory process (lungs clear, no SOB or wheeze, no fever, no fatigue). Could be COVID (though she had a negative test on Sunday). Will check this again to be sure. Could be influenza though she declines test today given that anti-viral therapy would no longer be indicated based on the time she developed symptoms. Likely bacterial sinusitis given yellowish-green sputum in the setting of sore throat and sinus congestion. Will treat with Augmentin for 7 days. Symptomatic cares (see patient instructions) and follow up precautions discussed in detail. Benzonatate also given for cough. Discussed reasons to call or return to clinic. Silvia acknowledges and demonstrates understanding of circumstances under which care should be sought urgently or emergently. Follow up as discussed. Discussed risks, benefits, alternatives, potential side effects, and proper administration of new medication / treatment. Agrees with plan of care. All questions answered.          BMI:   Estimated body mass index is 26.45 kg/m  as calculated from the following:    Height as of this encounter: 1.829 m (6').    Weight as of 12/22/20: 88.5 kg  (195 lb).   Weight management plan: Discussed healthy diet and exercise guidelines    See Patient Instructions    Return in about 7 months (around 11/5/2021) for persistent or worsening symptoms.    Jose Francisco Rosa NP  [facility] is a 21 year [facility] who presents for the following health issues     HPI     Acute Illness  Acute illness concerns: fever, cough, sore throat  Onset/Duration: started last week - got worse over this past weekend  Symptoms:  Fever: yes, just one day 99.9  Chills/Sweats: YES  Headache (location?): YES  Sinus Pressure: no  Conjunctivitis:  no  Ear Pain: yes, a week ago on the right side but not since then  Rhinorrhea: YES  Congestion: YES  Sore Throat: YES  Cough: YES  Wheeze: no  Decreased Appetite: no  Nausea: no  Vomiting: no  Diarrhea: no  Dysuria/Freq.: no  Dysuria or Hematuria: no  Fatigue/Achiness: YES, body aches  Sick/Strep Exposure: no  Therapies tried and outcome:     HPI: Silvia presents today with the complaints of sore throat, sinus congestion, cough, headache, fatigue, and body aches. Has had intermittent sore throat for over a month. However, last week, her sore throat worsened and she developed a low grade temperature and cough. Cough is productive (yellowish-green). No wheeze or shortness of breath. No GI symptoms. Other coworkers are ill with similar symptoms (they are all vaccinated against COVID). She, herself, submitted a nasopharyngeal self-swab at [facility], which was reportedly negative.     Review of Systems   Constitutional, HEENT, pulmonary systems are negative, except as otherwise noted.      Objective    /72 (BP Location: Right arm, Cuff Size: Adult Regular)   Pulse 100   Temp 98.5  F (36.9  C) (Tympanic)   Ht 1.829 m (6')   SpO2 96%   BMI 26.45 kg/m    Body mass index is 26.45 kg/m .  Physical Exam   GENERAL: alert and no distress  HENT: ear canals and TM's normal, nose without ulcers or lesions; Mouth-  erythematous posterior oropharynx, but without edema or exudate noted  NECK: no adenopathy, no asymmetry, masses, or scars and thyroid normal to palpation  RESP: lungs clear to auscultation - no rales, rhonchi or wheezes; No findings to suggest focal / lobar consolidation; Chest excursion, respiratory rate, and ventilatory effort / ease within normal limits. No indicators of respiratory distress.  CV: regular rate and rhythm, normal S1 S2, no S3 or S4, no murmur, click or rub, no peripheral edema and peripheral pulses strong  NEURO: Normal strength and tone, mentation intact and speech normal  PSYCH: mentation appears normal, affect normal/bright    No results found for this or any previous visit (from the past 24 hour(s)).

## 2021-04-06 LAB
SARS-COV-2 RNA RESP QL NAA+PROBE: NOT DETECTED
SPECIMEN SOURCE: NORMAL

## 2021-08-17 ENCOUNTER — OFFICE VISIT (OUTPATIENT)
Dept: FAMILY MEDICINE | Facility: CLINIC | Age: 21
End: 2021-08-17
Payer: COMMERCIAL

## 2021-08-17 VITALS
DIASTOLIC BLOOD PRESSURE: 70 MMHG | OXYGEN SATURATION: 99 % | BODY MASS INDEX: 27.53 KG/M2 | HEART RATE: 83 BPM | RESPIRATION RATE: 16 BRPM | TEMPERATURE: 97.7 F | SYSTOLIC BLOOD PRESSURE: 110 MMHG | WEIGHT: 203 LBS

## 2021-08-17 DIAGNOSIS — E55.9 VITAMIN D DEFICIENCY: ICD-10-CM

## 2021-08-17 DIAGNOSIS — M25.562 BILATERAL CHRONIC KNEE PAIN: ICD-10-CM

## 2021-08-17 DIAGNOSIS — R76.8 POSITIVE ANTINUCLEAR ANTIBODY: ICD-10-CM

## 2021-08-17 DIAGNOSIS — L81.9 ATYPICAL PIGMENTED LESION: ICD-10-CM

## 2021-08-17 DIAGNOSIS — R53.82 CHRONIC FATIGUE: Primary | ICD-10-CM

## 2021-08-17 DIAGNOSIS — Z11.59 NEED FOR HEPATITIS C SCREENING TEST: ICD-10-CM

## 2021-08-17 DIAGNOSIS — G89.29 BILATERAL CHRONIC KNEE PAIN: ICD-10-CM

## 2021-08-17 DIAGNOSIS — M25.561 BILATERAL CHRONIC KNEE PAIN: ICD-10-CM

## 2021-08-17 DIAGNOSIS — E53.8 VITAMIN B12 DEFICIENCY (NON ANEMIC): ICD-10-CM

## 2021-08-17 LAB
ERYTHROCYTE [DISTWIDTH] IN BLOOD BY AUTOMATED COUNT: 13.7 % (ref 10–15)
HCT VFR BLD AUTO: 41.3 % (ref 35–47)
HGB BLD-MCNC: 13.3 G/DL (ref 11.7–15.7)
MCH RBC QN AUTO: 29.7 PG (ref 26.5–33)
MCHC RBC AUTO-ENTMCNC: 32.2 G/DL (ref 31.5–36.5)
MCV RBC AUTO: 92 FL (ref 78–100)
PLATELET # BLD AUTO: 258 10E3/UL (ref 150–450)
RBC # BLD AUTO: 4.48 10E6/UL (ref 3.8–5.2)
WBC # BLD AUTO: 6.9 10E3/UL (ref 4–11)

## 2021-08-17 PROCEDURE — 36415 COLL VENOUS BLD VENIPUNCTURE: CPT | Performed by: FAMILY MEDICINE

## 2021-08-17 PROCEDURE — 86803 HEPATITIS C AB TEST: CPT | Performed by: FAMILY MEDICINE

## 2021-08-17 PROCEDURE — 82728 ASSAY OF FERRITIN: CPT | Performed by: FAMILY MEDICINE

## 2021-08-17 PROCEDURE — 82607 VITAMIN B-12: CPT | Performed by: FAMILY MEDICINE

## 2021-08-17 PROCEDURE — 82306 VITAMIN D 25 HYDROXY: CPT | Performed by: FAMILY MEDICINE

## 2021-08-17 PROCEDURE — 86038 ANTINUCLEAR ANTIBODIES: CPT | Performed by: FAMILY MEDICINE

## 2021-08-17 PROCEDURE — 84443 ASSAY THYROID STIM HORMONE: CPT | Performed by: FAMILY MEDICINE

## 2021-08-17 PROCEDURE — 85027 COMPLETE CBC AUTOMATED: CPT | Performed by: FAMILY MEDICINE

## 2021-08-17 PROCEDURE — 80053 COMPREHEN METABOLIC PANEL: CPT | Performed by: FAMILY MEDICINE

## 2021-08-17 PROCEDURE — 86039 ANTINUCLEAR ANTIBODIES (ANA): CPT | Performed by: FAMILY MEDICINE

## 2021-08-17 PROCEDURE — 99214 OFFICE O/P EST MOD 30 MIN: CPT | Performed by: FAMILY MEDICINE

## 2021-08-17 NOTE — PROGRESS NOTES
Assessment & Plan     Chronic fatigue  Patient has symptoms of chronic fatigue ongoing for several months.  Would like labs done to investigate it further.  Following labs ordered.  Await the test results  - Ferritin  - Vitamin D Deficiency  - Vitamin B12  - TSH with free T4 reflex  - CBC with platelets  - Comprehensive metabolic panel    Need for hepatitis C screening test    - Hepatitis C Screen Reflex to HCV RNA Quant and Genotype; Future  - Hepatitis C Screen Reflex to HCV RNA Quant and Genotype    Atypical pigmented lesion  Recommending to see dermatology for evaluation management  - Adult Dermatology Referral; Future    Bilateral chronic knee pain  Patient has had a positive ILA previously in 2015.  I have repeated ILA level today.  Await the results.  If it is positive I would recommend her seeing rheumatology.  - Anti Nuclear Marline IgG by IFA with Reflex; Future  - Anti Nuclear Marline IgG by IFA with Reflex      Niesha Maki MD  [facility] is a 21 year old who presents for the following health issues     HPI     Concern - Fatigue  Onset: couple of months   Description: feeling more tired, joint pain/aches - requesting hormones and vitamin levels to be checked   Previously she has had a positive ILA level checked at [facility].  This was in 2015.  She tells that both her knee joints hurt off and on.  She was told that this is a sign of autoimmune arthritis.  She would like to get that test repeated again.      Skin Lesion  Onset/Duration: years + the past year has changed in size  Description  Location: lower left abd area  Color: brown  Border description: raised  Itching: no  Bleeding:  no  Intensity:  moderate  Progression of Symptoms:  worsening  Accompanying signs and symptoms:   Bleeding: no  Excessive sun exposure/tanning: no  History:           Any previous history of skin cancer: no  Any family history of melanoma: no  Previous episodes of similar  lesion: no        Review of Systems   CONSTITUTIONAL: NEGATIVE for fever, chills, change in weight  ENT/MOUTH: NEGATIVE for ear, mouth and throat problems  RESP: NEGATIVE for significant cough or SOB  CV: NEGATIVE for chest pain, palpitations or peripheral edema      Objective    /70   Pulse 83   Temp 97.7  F (36.5  C) (Tympanic)   Resp 16   Wt 92.1 kg (203 lb)   LMP 07/27/2021 (Approximate)   SpO2 99%   BMI 27.53 kg/m    Body mass index is 27.53 kg/m .  Physical Exam   GENERAL: healthy, alert and no distress  NECK: no adenopathy, no asymmetry, masses, or scars and thyroid normal to palpation  RESP: lungs clear to auscultation - no rales, rhonchi or wheezes  CV: regular rate and rhythm,  Skin: Left lower quadrant abdominal skin with raised pigmented papule.  ABDOMEN: soft, nontender, no hepatosplenomegaly, no masses and bowel sounds normal  MS: no gross musculoskeletal defects noted, no edema

## 2021-08-18 LAB
ALBUMIN SERPL-MCNC: 3.2 G/DL (ref 3.4–5)
ALP SERPL-CCNC: 60 U/L (ref 40–150)
ALT SERPL W P-5'-P-CCNC: 33 U/L (ref 0–50)
ANION GAP SERPL CALCULATED.3IONS-SCNC: 8 MMOL/L (ref 3–14)
AST SERPL W P-5'-P-CCNC: 15 U/L (ref 0–45)
BILIRUB SERPL-MCNC: 0.6 MG/DL (ref 0.2–1.3)
BUN SERPL-MCNC: 11 MG/DL (ref 7–30)
CALCIUM SERPL-MCNC: 8.9 MG/DL (ref 8.5–10.1)
CHLORIDE BLD-SCNC: 108 MMOL/L (ref 94–109)
CO2 SERPL-SCNC: 22 MMOL/L (ref 20–32)
CREAT SERPL-MCNC: 0.9 MG/DL (ref 0.52–1.04)
DEPRECATED CALCIDIOL+CALCIFEROL SERPL-MC: 21 UG/L (ref 20–75)
FERRITIN SERPL-MCNC: 18 NG/ML (ref 12–150)
GFR SERPL CREATININE-BSD FRML MDRD: >90 ML/MIN/1.73M2
GLUCOSE BLD-MCNC: 124 MG/DL (ref 70–99)
HCV AB SERPL QL IA: NONREACTIVE
POTASSIUM BLD-SCNC: 4 MMOL/L (ref 3.4–5.3)
PROT SERPL-MCNC: 7.5 G/DL (ref 6.8–8.8)
SODIUM SERPL-SCNC: 138 MMOL/L (ref 133–144)
TSH SERPL DL<=0.005 MIU/L-ACNC: 1.63 MU/L (ref 0.4–4)
VIT B12 SERPL-MCNC: 175 PG/ML (ref 193–986)

## 2021-08-19 LAB
ANA PAT SER IF-IMP: ABNORMAL
ANA SER QL IF: POSITIVE
ANA TITR SER IF: ABNORMAL {TITER}

## 2021-08-20 ENCOUNTER — TELEPHONE (OUTPATIENT)
Dept: FAMILY MEDICINE | Facility: CLINIC | Age: 21
End: 2021-08-20

## 2021-08-20 RX ORDER — LANOLIN ALCOHOL/MO/W.PET/CERES
1000 CREAM (GRAM) TOPICAL DAILY
Qty: 90 TABLET | Refills: 1 | Status: SHIPPED | OUTPATIENT
Start: 2021-08-20

## 2021-08-20 RX ORDER — CHOLECALCIFEROL (VITAMIN D3) 50 MCG
1 TABLET ORAL DAILY
Qty: 90 TABLET | Refills: 3 | Status: SHIPPED | OUTPATIENT
Start: 2021-08-20

## 2021-08-20 NOTE — TELEPHONE ENCOUNTER
----- Message from Niesha Maki MD sent at 8/20/2021 11:49 AM CDT -----  Please call the patient to notify patient that the blood work shows a positive result for LIA, antinuclear antibody.  Therefore I would like her to see a rheumatologist.  Referral placed.    Vitamin D level is borderline low, therefore I would like her to take vitamin D supplement, 2000 IUs daily.  Medication ordered    Vitamin B12 is low.  Therefore like her to take vitamin B12 1000 MCG's daily.  Medication ordered.    Rest of the labs are within normal range.    Niesha Maki MD

## 2021-08-20 NOTE — TELEPHONE ENCOUNTER
S/w pt and gave Dr. Maki's message below and referral information.    Pt states understanding.    Iva GUNN RN  EP Triage

## 2021-08-20 NOTE — RESULT ENCOUNTER NOTE
Please call the patient to notify patient that the blood work shows a positive result for ILA, antinuclear antibody.  Therefore I would like her to see a rheumatologist.  Referral placed.    Vitamin D level is borderline low, therefore I would like her to take vitamin D supplement, 2000 IUs daily.  Medication ordered    Vitamin B12 is low.  Therefore like her to take vitamin B12 1000 MCG's daily.  Medication ordered.    Rest of the labs are within normal range.    Niesha Maki MD

## 2021-09-30 ENCOUNTER — OFFICE VISIT (OUTPATIENT)
Dept: FAMILY MEDICINE | Facility: CLINIC | Age: 21
End: 2021-09-30
Payer: COMMERCIAL

## 2021-09-30 VITALS
TEMPERATURE: 97.7 F | HEIGHT: 72 IN | SYSTOLIC BLOOD PRESSURE: 110 MMHG | HEART RATE: 83 BPM | RESPIRATION RATE: 8 BRPM | DIASTOLIC BLOOD PRESSURE: 78 MMHG | OXYGEN SATURATION: 99 % | BODY MASS INDEX: 27.5 KG/M2 | WEIGHT: 203 LBS

## 2021-09-30 DIAGNOSIS — Z11.1 SCREENING EXAMINATION FOR PULMONARY TUBERCULOSIS: ICD-10-CM

## 2021-09-30 DIAGNOSIS — Z23 NEED FOR PROPHYLACTIC VACCINATION AND INOCULATION AGAINST INFLUENZA: Primary | ICD-10-CM

## 2021-09-30 PROCEDURE — 90471 IMMUNIZATION ADMIN: CPT | Performed by: FAMILY MEDICINE

## 2021-09-30 PROCEDURE — 99213 OFFICE O/P EST LOW 20 MIN: CPT | Mod: 25 | Performed by: FAMILY MEDICINE

## 2021-09-30 PROCEDURE — 90686 IIV4 VACC NO PRSV 0.5 ML IM: CPT | Performed by: FAMILY MEDICINE

## 2021-09-30 PROCEDURE — 86481 TB AG RESPONSE T-CELL SUSP: CPT | Performed by: FAMILY MEDICINE

## 2021-09-30 PROCEDURE — 36415 COLL VENOUS BLD VENIPUNCTURE: CPT | Performed by: FAMILY MEDICINE

## 2021-09-30 ASSESSMENT — MIFFLIN-ST. JEOR: SCORE: 1792.8

## 2021-09-30 ASSESSMENT — PAIN SCALES - GENERAL: PAINLEVEL: NO PAIN (0)

## 2021-09-30 NOTE — LETTER
October 8, 2021      Silvia Cullen  [address]  [place] [address]        Dear ,    We are writing to inform you of your test results.    I have reviewed your labs. Quantifieron gold test for TB screening is negative.      Resulted Orders   Quantiferon TB Gold Plus Grey Tube   Result Value Ref Range    Quantiferon Nil Tube 0.05 IU/mL   Quantiferon TB Gold Plus Green Tube   Result Value Ref Range    Quantiferon TB1 Tube 0.04 IU/mL   Quantiferon TB Gold Plus Yellow Tube   Result Value Ref Range    Quantiferon TB2 Tube 0.04    Quantiferon TB Gold Plus Purple Tube   Result Value Ref Range    Quantiferon Mitogen 10.00 IU/mL   Quantiferon TB Gold Plus   Result Value Ref Range    Quantiferon-TB Gold Plus Negative Negative      Comment:      No interferon gamma response to M.tuberculosis antigens was detected. Infection with M.tuberculosis is unlikely, however a single negative result does not exclude infection. In patients at high risk for infection, a second test should be considered in accordance with the 2017 ATS/IDSA/CDC Clinical Pract  ice Guidelines for Diagnosis of Tuberculosis in Adults and Children     TB1 Ag minus Nil Value -0.01 IU/mL    TB2 Ag minus Nil Value -0.01 IU/mL    Mitogen minus Nil Result 9.95 IU/mL    Nil Result 0.05 IU/mL       If you have any questions or concerns, please call the clinic at the number listed above.       Sincerely,      Charlie Martini MD

## 2021-09-30 NOTE — PROGRESS NOTES
Assessment & Plan     Need for prophylactic vaccination and inoculation against influenza    - REVIEW OF HEALTH MAINTENANCE PROTOCOL ORDERS  - INFLUENZA VACCINE IM > 6 MONTHS VALENT IIV4 (AFLURIA/FLUZONE)    Screening examination for pulmonary tuberculosis    - Quantiferon TB Gold Plus; Future  - Quantiferon TB Gold Plus         BMI:   Estimated body mass index is 27.53 kg/m  as calculated from the following:    Height as of this encounter: 1.829 m (6').    Weight as of this encounter: 92.1 kg (203 lb).     Charlie Martini MD  [facility] is a 21 year old who presents for the following health issues     HPI     Patient is here regarding order request for TB  - requesting blood test for school    Patient would like to have flu shot - PENDED    Denies any concerns.  No recent illness no night sweats no active symptoms of TB.    Review of Systems   Constitutional, HEENT, cardiovascular, pulmonary, gi and gu systems are negative, except as otherwise noted.      Objective    /78   Pulse 83   Temp 97.7  F (36.5  C) (Tympanic)   Resp 8   Ht 1.829 m (6')   Wt 92.1 kg (203 lb)   SpO2 99%   BMI 27.53 kg/m    Body mass index is 27.53 kg/m .  Physical Exam   GENERAL: healthy, alert and no distress  NECK: no adenopathy, no asymmetry, masses, or scars and thyroid normal to palpation  RESP: lungs clear to auscultation - no rales, rhonchi or wheezes

## 2021-10-03 LAB
QUANTIFERON MITOGEN: 10 IU/ML
QUANTIFERON NIL TUBE: 0.05 IU/ML
QUANTIFERON TB1 TUBE: 0.04 IU/ML
QUANTIFERON TB2 TUBE: 0.04

## 2021-10-04 LAB
GAMMA INTERFERON BACKGROUND BLD IA-ACNC: 0.05 IU/ML
M TB IFN-G BLD-IMP: NEGATIVE
M TB IFN-G CD4+ BCKGRND COR BLD-ACNC: 9.95 IU/ML
MITOGEN IGNF BCKGRD COR BLD-ACNC: -0.01 IU/ML
MITOGEN IGNF BCKGRD COR BLD-ACNC: -0.01 IU/ML

## 2021-12-22 ENCOUNTER — OFFICE VISIT (OUTPATIENT)
Dept: DERMATOLOGY | Facility: CLINIC | Age: 21
End: 2021-12-22
Payer: COMMERCIAL

## 2021-12-22 VITALS — SYSTOLIC BLOOD PRESSURE: 152 MMHG | HEART RATE: 105 BPM | OXYGEN SATURATION: 99 % | DIASTOLIC BLOOD PRESSURE: 91 MMHG

## 2021-12-22 DIAGNOSIS — L70.0 ACNE VULGARIS: ICD-10-CM

## 2021-12-22 DIAGNOSIS — D22.9 BENIGN NEVUS: Primary | ICD-10-CM

## 2021-12-22 PROCEDURE — 99243 OFF/OP CNSLTJ NEW/EST LOW 30: CPT | Performed by: DERMATOLOGY

## 2021-12-22 RX ORDER — TRETINOIN 0.5 MG/G
CREAM TOPICAL AT BEDTIME
Qty: 45 G | Refills: 11 | Status: SHIPPED | OUTPATIENT
Start: 2021-12-22 | End: 2023-03-15

## 2021-12-22 NOTE — LETTER
12/22/2021         RE: Silvia Cullen  [address]  [place] [address]        Dear Colleague,    Thank you for referring your patient, Silvia Cullen, to the [facility]. Please see a copy of my visit note below.    Silvia Cullen , a 21 year old year old female patient, I was asked to see by Dr. Martini for atypical mole and acne.  Patient states this has been present for a while.  Patient reports the following symptoms:  none .  Patient reports the following previous treatments none.  Patient reports the following modifying factors none.  Associated symptoms: none.  Patient has no other skin complaints today.  Remainder of the HPI, Meds, PMH, Allergies, FH, and SH was reviewed in chart.    History reviewed. No pertinent past medical history.    History reviewed. No pertinent surgical history.     Family History   Problem Relation Age of Onset     Crohn's Disease No family hx of      Ulcerative Colitis No family hx of      GERD No family hx of      Stomach Cancer No family hx of        Social History     Socioeconomic History     Marital status: Single     Spouse name: Not on file     Number of children: Not on file     Years of education: Not on file     Highest education level: Not on file   Occupational History     Not on file   Tobacco Use     Smoking status: Never Smoker     Smokeless tobacco: Never Used   Substance and Sexual Activity     Alcohol use: Yes     Drug use: No     Sexual activity: Yes     Partners: Male   Other Topics Concern     Not on file   Social History Narrative     Not on file     Social Determinants of Health     Financial Resource Strain: Not on file   Food Insecurity: Not on file   Transportation Needs: Not on file   Physical Activity: Not on file   Stress: Not on file   Social Connections: Not on file   Intimate Partner Violence: Not on file   Housing Stability: Not on file       Outpatient Encounter Medications as of 12/22/2021   Medication Sig Dispense Refill      cyanocobalamin (VITAMIN B-12) 1000 MCG tablet Take 1 tablet (1,000 mcg) by mouth daily 90 tablet 1     ELURYNG 0.12-0.015 MG/24HR vaginal ring INSERT 1 VAGINAL RING BY VAGINAL ROUTE EVERY MONTH. LEAVE IN PLACE FOR 3 WEEKS REMOVE FOR 1 WEEK       vitamin D3 (CHOLECALCIFEROL) 50 mcg (2000 units) tablet Take 1 tablet (50 mcg) by mouth daily 90 tablet 3     No facility-administered encounter medications on file as of 12/22/2021.             Review Of Systems  Skin: As above  Eyes: negative  Ears/Nose/Throat: negative  Respiratory: No shortness of breath, dyspnea on exertion, cough, or hemoptysis  Cardiovascular: negative  Gastrointestinal: negative  Genitourinary: negative  Musculoskeletal: negative  Neurologic: negative  Psychiatric: negative  Hematologic/Lymphatic/Immunologic: negative  Endocrine: negative      O:   NAD, WDWN, Alert & Oriented, Mood & Affect wnl, Vitals stable   Here today alone   BP (!) 152/91   Pulse 105   SpO2 99%    General appearance raisa ii   Vitals stable   Alert, oriented and in no acute distress   2+compedones on face  L ab fleshy papillomatous brown papule  The remainder of expanded problem focused exam was normal; the following areas were examined:  conjunctiva/lids, face, neck, , chest, digits/nails, RUE, LUE.      Eyes: Conjunctivae/lids:Normal     ENT: Lips, buccal mucosa, tongue: normal    MSK:Normal    Cardiovascular: peripheral edema none    Pulm: Breathing Normal    Neuro/Psych: Orientation:Normal; Mood/Affect:Normal      A/P:  1. Irritated nevus  Shave ex discussed with patient she declines  2. Acne  Acne vulgaris    Pathophysiology discussed with pateint and information provided   I discussed with patient Oral Abx, Aldactone, Topical creams, light therapies and OCT  Treating acne is preventative    Acne can be effectively treated, although response may sometimes be slow.   Where possible, avoid excessively humid conditions such as a sauna, working in an unventilated kitchen  or tropical vacations.   If you smoke, stop. Nicotine increases sebum retention and increased scale within the follicles, forming comedones (black and whiteheads).    Minimize the application of oils and cosmetics to the affected skin.   Abrasive skin treatments can aggravate acne.   Try not to scratch or pick the spots.   To avoid sunburn, protect your skin outdoors using a sunscreen and protective clothing.  No relationship between particular foods and acne has been proven. However, reports suggest low glycemic and low dairy diet are helpful for some people.    May take 3-4 months to see 50% improvement  May get worse during initial phase of treatment  Tretinoin at bedtime, dryness, irritation and way to prevent discussed with patient   BPO wash daily or every other day depending on dryness  Aggressive use of bland emollients discussed with patient   Gly nunu pads daily   Return to clinic 3 months  Previous clinic  notes and pertinent laboratory tests were reviewed prior to Silvia Cullen's visit.  UV precautions reviewed with patient.  Skin care regimen reviewed with patient: Eliminate harsh soaps, i.e. Dial, zest, irsih spring; Mild soaps such as Cetaphil or Dove sensitive skin, avoid hot or cold showers, aggressive use of emollients including vanicream, cetaphil or cerave discussed with patient.          Again, thank you for allowing me to participate in the care of your patient.        Sincerely,        Juma Medina MD

## 2021-12-22 NOTE — NURSING NOTE
No chief complaint on file.      Vitals:    12/22/21 1304   BP: (!) 152/91   Pulse: 105   SpO2: 99%     Wt Readings from Last 1 Encounters:   09/30/21 92.1 kg (203 lb)       Marcy Bhatti LPN.................12/22/2021

## 2021-12-22 NOTE — PROGRESS NOTES
Silvia Cullen , a 21 year old year old female patient, I was asked to see by Dr. Martini for atypical mole and acne.  Patient states this has been present for a while.  Patient reports the following symptoms:  none .  Patient reports the following previous treatments none.  Patient reports the following modifying factors none.  Associated symptoms: none.  Patient has no other skin complaints today.  Remainder of the HPI, Meds, PMH, Allergies, FH, and SH was reviewed in chart.    History reviewed. No pertinent past medical history.    History reviewed. No pertinent surgical history.     Family History   Problem Relation Age of Onset     Crohn's Disease No family hx of      Ulcerative Colitis No family hx of      GERD No family hx of      Stomach Cancer No family hx of        Social History     Socioeconomic History     Marital status: Single     Spouse name: Not on file     Number of children: Not on file     Years of education: Not on file     Highest education level: Not on file   Occupational History     Not on file   Tobacco Use     Smoking status: Never Smoker     Smokeless tobacco: Never Used   Substance and Sexual Activity     Alcohol use: Yes     Drug use: No     Sexual activity: Yes     Partners: Male   Other Topics Concern     Not on file   Social History Narrative     Not on file     Social Determinants of Health     Financial Resource Strain: Not on file   Food Insecurity: Not on file   Transportation Needs: Not on file   Physical Activity: Not on file   Stress: Not on file   Social Connections: Not on file   Intimate Partner Violence: Not on file   Housing Stability: Not on file       Outpatient Encounter Medications as of 12/22/2021   Medication Sig Dispense Refill     cyanocobalamin (VITAMIN B-12) 1000 MCG tablet Take 1 tablet (1,000 mcg) by mouth daily 90 tablet 1     ELURYNG 0.12-0.015 MG/24HR vaginal ring INSERT 1 VAGINAL RING BY VAGINAL ROUTE EVERY MONTH. LEAVE IN PLACE FOR 3 WEEKS REMOVE FOR 1 WEEK        vitamin D3 (CHOLECALCIFEROL) 50 mcg (2000 units) tablet Take 1 tablet (50 mcg) by mouth daily 90 tablet 3     No facility-administered encounter medications on file as of 12/22/2021.             Review Of Systems  Skin: As above  Eyes: negative  Ears/Nose/Throat: negative  Respiratory: No shortness of breath, dyspnea on exertion, cough, or hemoptysis  Cardiovascular: negative  Gastrointestinal: negative  Genitourinary: negative  Musculoskeletal: negative  Neurologic: negative  Psychiatric: negative  Hematologic/Lymphatic/Immunologic: negative  Endocrine: negative      O:   NAD, WDWN, Alert & Oriented, Mood & Affect wnl, Vitals stable   Here today alone   BP (!) 152/91   Pulse 105   SpO2 99%    General appearance raisa ii   Vitals stable   Alert, oriented and in no acute distress   2+compedones on face  L ab fleshy papillomatous brown papule  The remainder of expanded problem focused exam was normal; the following areas were examined:  conjunctiva/lids, face, neck, , chest, digits/nails, RUE, LUE.      Eyes: Conjunctivae/lids:Normal     ENT: Lips, buccal mucosa, tongue: normal    MSK:Normal    Cardiovascular: peripheral edema none    Pulm: Breathing Normal    Neuro/Psych: Orientation:Normal; Mood/Affect:Normal      A/P:  1. Irritated nevus  Shave ex discussed with patient she declines  2. Acne  Acne vulgaris    Pathophysiology discussed with pateint and information provided   I discussed with patient Oral Abx, Aldactone, Topical creams, light therapies and OCT  Treating acne is preventative    Acne can be effectively treated, although response may sometimes be slow.   Where possible, avoid excessively humid conditions such as a sauna, working in an unventilated kitchen or tropical vacations.   If you smoke, stop. Nicotine increases sebum retention and increased scale within the follicles, forming comedones (black and whiteheads).    Minimize the application of oils and cosmetics to the affected skin.   Abrasive  skin treatments can aggravate acne.   Try not to scratch or pick the spots.   To avoid sunburn, protect your skin outdoors using a sunscreen and protective clothing.  No relationship between particular foods and acne has been proven. However, reports suggest low glycemic and low dairy diet are helpful for some people.    May take 3-4 months to see 50% improvement  May get worse during initial phase of treatment  Tretinoin at bedtime, dryness, irritation and way to prevent discussed with patient   BPO wash daily or every other day depending on dryness  Aggressive use of bland emollients discussed with patient   Gly nunu pads daily   Return to clinic 3 months  Previous clinic  notes and pertinent laboratory tests were reviewed prior to Silvia Cullen's visit.  UV precautions reviewed with patient.  Skin care regimen reviewed with patient: Eliminate harsh soaps, i.e. Dial, zest, irsih spring; Mild soaps such as Cetaphil or Dove sensitive skin, avoid hot or cold showers, aggressive use of emollients including vanicream, cetaphil or cerave discussed with patient.

## 2021-12-22 NOTE — PATIENT INSTRUCTIONS
**Start using the prescription topical cream at bedtime    **Start using an over the counter Benzoyl Peroxide wash in the AM    **Start using a over the counter Glycolic Acid Pad in the AM    **Follow up in 3 months

## 2022-04-01 ENCOUNTER — OFFICE VISIT (OUTPATIENT)
Dept: FAMILY MEDICINE | Facility: CLINIC | Age: 22
End: 2022-04-01
Payer: COMMERCIAL

## 2022-04-01 VITALS — SYSTOLIC BLOOD PRESSURE: 126 MMHG | DIASTOLIC BLOOD PRESSURE: 70 MMHG

## 2022-04-01 DIAGNOSIS — L21.9 SEBORRHEIC DERMATITIS: ICD-10-CM

## 2022-04-01 DIAGNOSIS — L70.0 ACNE VULGARIS: Primary | ICD-10-CM

## 2022-04-01 DIAGNOSIS — D48.9 NEOPLASM OF UNCERTAIN BEHAVIOR: ICD-10-CM

## 2022-04-01 PROCEDURE — 11102 TANGNTL BX SKIN SINGLE LES: CPT | Performed by: NURSE PRACTITIONER

## 2022-04-01 PROCEDURE — 99214 OFFICE O/P EST MOD 30 MIN: CPT | Mod: 25 | Performed by: NURSE PRACTITIONER

## 2022-04-01 PROCEDURE — 88305 TISSUE EXAM BY PATHOLOGIST: CPT | Performed by: PATHOLOGY

## 2022-04-01 RX ORDER — TRETINOIN 0.25 MG/G
CREAM TOPICAL
Qty: 45 G | Refills: 11 | Status: SHIPPED | OUTPATIENT
Start: 2022-04-01 | End: 2023-03-15

## 2022-04-01 RX ORDER — KETOCONAZOLE 20 MG/ML
SHAMPOO TOPICAL
Qty: 120 ML | Refills: 11 | Status: SHIPPED | OUTPATIENT
Start: 2022-04-01 | End: 2023-03-15

## 2022-04-01 RX ORDER — CLINDAMYCIN PHOSPHATE 10 UG/ML
LOTION TOPICAL EVERY MORNING
Qty: 60 ML | Refills: 11 | Status: SHIPPED | OUTPATIENT
Start: 2022-04-01 | End: 2023-03-15

## 2022-04-01 RX ORDER — SPIRONOLACTONE 50 MG/1
TABLET, FILM COATED ORAL
Qty: 180 TABLET | Refills: 1 | Status: SHIPPED | OUTPATIENT
Start: 2022-04-01 | End: 2023-02-16

## 2022-04-01 RX ORDER — FLUOCINONIDE TOPICAL SOLUTION USP, 0.05% 0.5 MG/ML
SOLUTION TOPICAL 2 TIMES DAILY
Qty: 60 ML | Refills: 2 | Status: SHIPPED | OUTPATIENT
Start: 2022-04-01 | End: 2023-03-15

## 2022-04-01 NOTE — PROGRESS NOTES
[facility] Dermatology Note  Encounter Date: Apr 1, 2022  Office Visit     Reviewed patients past medical history and pertinent chart review prior to patients visit today.     Dermatology Problem List:  1. Acne  2. Changing mole, biopsy 04/01/22   3. Seborrheic dermatitis  ____________________________________________    Assessment & Plan:     # Acne vulgaris    -Recommend benzoyl peroxide 10% wash to all areas of acne. Patient counseled medication can bleach towels and clothing.  -Start clindamycin lotion once daily in the am.  -Start tretinoin 0.025% cream. Apply once every other day and increase to nightly as tolerate.  Waiting a few minutes after washing affected areas will decrease irritation. Method of application, side effects and expected results were discussed. The patient will apply pea size amount to the entire face, avoid areas around the eyes, corners of nose and mouth. Discussed side effects including photosensitivity and irritation.    -Start spironolactone 50 mg daily for 1 to 2 weeks and then if no side effects she can increase to 100 mg daily. This is a medication used in high doses for blood pressure but in lower doses for acne due to its anti-male hormone effects. Main side effects are dizziness, menstrual spotting, breast tenderness, high potassium, and can be harmful to a fetus if you were to become pregnant. It is very important to avoid pregnancy while on this medication and to stop it immediately if you do become pregnant. Advised to stay well hydrated while on the medication and notify us if she were to feel heart palpitations. She is a young and healthy individual so we are not checking potassium levels regularly unless side effects occur.      #Neoplasm of uncertain behavior:  Left low abomdn  DDx includes irritated nevus vs dysplastic nevus. Shave biopsy today.    Procedure Note: Biopsy by shave technique  The risks and benefits of the procedure were described to the  patient. These include but are not limited to bleeding, infection, scar, incomplete removal, and non-diagnostic biopsy. Written informed consent was obtained. The above site(s) was cleansed with an alcohol pad and injected with 1% lidocaine with epinephrine. Once anesthesia was obtained, a biopsy(ies) was performed with Gilette blade. The tissue(s) was placed in a labeled container(s) with formalin and sent to pathology. Hemostasis was achieved with aluminum chloride. Vaseline and a bandage were applied to the wound(s). The patient tolerated the procedure well and was given post biopsy care instructions.     # Seborrheic dermatitis, scalp. Discussed that this is a recurring rash for most people and will need some maintenance even after rash has resolved. Given prescription for ketoconazole 2% shampoo to use daily while rash is present, and ongoing 1-3 times weekly when rash is well controlled. Advised to leave on for 2-5 minutes before rinsing. Also given to use 1-2 times daily when rash is itchy. Stop use when rash is not symptomatic and use for flares only. Advised to avoid face, groin and skin folds. Councled about use and side effects of thinning of the skin, striae, erythema, and worsening of rash.        Follow-up: 3 months for follow up if not well controlled. 1 year if well controlled on topical therapy only, sooner PRN new concerns    Ronit Quevedo, RONALDO CNP on 4/1/2022 at 1:47 PM   _______________________________________    CC: Derm Problem (mole abd, dry scalp, and acne)    HPI:  Ms. Silvia Cullen is a(n) 22 year old female who presents today as a new patient for acne. Patient has acne on face,  for many years. They have tried over the counter acne washes without good control of acne. She has been on tretinoin 0.05% cream but only tolerates it once weekly which is not helping. She flares monthly coorolating with her menstrual cycle. The mole on her left low abdomen has been growing and changing color,  and bled once all within the past year. She has had it since childhood.     She has a flaky scalp that she treats with nizerol shampoo OTC. This helps but does not control it.       Patient is otherwise feeling well, without additional skin concerns.      Physical Exam:  Vitals: /70   SKIN: Acne exam, which includes the face, neck, upper central chest, and upper central back was performed.  - open and closed comedones scattered on the lower face  -scattered inflammatory papules and pustules on lower face  -inflammatory nodules on the cheeks and jawline  -scattered postinflammatory hyperpigmentation and purpura on the cheeks  -left low abdomen, 9 x 6 mm dark brown raised soft papule with comedones within  -scalp has scattered erythema and moderate scale, right side worse than left    - No other lesions of concern on areas examined.     Medications:  Current Outpatient Medications   Medication     clindamycin (CLEOCIN T) 1 % external lotion     cyanocobalamin (VITAMIN B-12) 1000 MCG tablet     ELURYNG 0.12-0.015 MG/24HR vaginal ring     fluocinonide (LIDEX) 0.05 % external solution     ketoconazole (NIZORAL) 2 % external shampoo     tretinoin (RETIN-A) 0.025 % external cream     tretinoin (RETIN-A) 0.05 % external cream     vitamin D3 (CHOLECALCIFEROL) 50 mcg (2000 units) tablet     No current facility-administered medications for this visit.      Past Medical History:   Patient Active Problem List   Diagnosis     Other acne     History reviewed. No pertinent past medical history.    CC Referred MD Juan Jose  No address on file on close of this encounter.

## 2022-04-01 NOTE — LETTER
4/1/2022         RE: Silvia Cullen  [address]  [place] [address]        Dear Colleague,    Thank you for referring your patient, Silvia Cullen, to the [facility] [place]. Please see a copy of my visit note below.    [facility] Dermatology Note  Encounter Date: Apr 1, 2022  Office Visit     Reviewed patients past medical history and pertinent chart review prior to patients visit today.     Dermatology Problem List:  1. Acne  2. Changing mole, biopsy 04/01/22   3. Seborrheic dermatitis  ____________________________________________    Assessment & Plan:     # Acne vulgaris    -Recommend benzoyl peroxide 10% wash to all areas of acne. Patient counseled medication can bleach towels and clothing.  -Start clindamycin lotion once daily in the am.  -Start tretinoin 0.025% cream. Apply once every other day and increase to nightly as tolerate.  Waiting a few minutes after washing affected areas will decrease irritation. Method of application, side effects and expected results were discussed. The patient will apply pea size amount to the entire face, avoid areas around the eyes, corners of nose and mouth. Discussed side effects including photosensitivity and irritation.    -consider spironolactone. We discussed all side effects today in case she decides she wants to start.     #Neoplasm of uncertain behavior:  Left low abomdn  DDx includes irritated nevus vs dysplastic nevus. Shave biopsy today.    Procedure Note: Biopsy by shave technique  The risks and benefits of the procedure were described to the patient. These include but are not limited to bleeding, infection, scar, incomplete removal, and non-diagnostic biopsy. Written informed consent was obtained. The above site(s) was cleansed with an alcohol pad and injected with 1% lidocaine with epinephrine. Once anesthesia was obtained, a biopsy(ies) was performed with Gilette blade. The tissue(s) was placed in a labeled  container(s) with formalin and sent to pathology. Hemostasis was achieved with aluminum chloride. Vaseline and a bandage were applied to the wound(s). The patient tolerated the procedure well and was given post biopsy care instructions.     # Seborrheic dermatitis, scalp. Discussed that this is a recurring rash for most people and will need some maintenance even after rash has resolved. Given prescription for ketoconazole 2% shampoo to use daily while rash is present, and ongoing 1-3 times weekly when rash is well controlled. Advised to leave on for 2-5 minutes before rinsing. Also given to use 1-2 times daily when rash is itchy. Stop use when rash is not symptomatic and use for flares only. Advised to avoid face, groin and skin folds. Councled about use and side effects of thinning of the skin, striae, erythema, and worsening of rash.        Follow-up: 3 months for follow up if not well controlled. 1 year if well controlled on topical therapy only, sooner PRN new concerns    Ronit Quevedo, APRN CNP on 4/1/2022 at 1:47 PM   _______________________________________    CC: Derm Problem (mole abd, dry scalp, and acne)    HPI:  Ms. Silvia Cullen is a(n) 22 year old female who presents today as a new patient for acne. Patient has acne on face,  for many years. They have tried over the counter acne washes without good control of acne. She has been on tretinoin 0.05% cream but only tolerates it once weekly which is not helping. She flares monthly coorolating with her menstrual cycle. The mole on her left low abdomen has been growing and changing color, and bled once all within the past year. She has had it since childhood.     She has a flaky scalp that she treats with nizerol shampoo OTC. This helps but does not control it.       Patient is otherwise feeling well, without additional skin concerns.      Physical Exam:  Vitals: /70   SKIN: Acne exam, which includes the face, neck, upper central chest, and upper  central back was performed.  - open and closed comedones scattered on the lower face  -scattered inflammatory papules and pustules on lower face  -inflammatory nodules on the cheeks and jawline  -scattered postinflammatory hyperpigmentation and purpura on the cheeks  -left low abdomen, 9 x 6 mm dark brown raised soft papule with comedones within  -scalp has scattered erythema and moderate scale, right side worse than left    - No other lesions of concern on areas examined.     Medications:  Current Outpatient Medications   Medication     clindamycin (CLEOCIN T) 1 % external lotion     cyanocobalamin (VITAMIN B-12) 1000 MCG tablet     ELURYNG 0.12-0.015 MG/24HR vaginal ring     fluocinonide (LIDEX) 0.05 % external solution     ketoconazole (NIZORAL) 2 % external shampoo     tretinoin (RETIN-A) 0.025 % external cream     tretinoin (RETIN-A) 0.05 % external cream     vitamin D3 (CHOLECALCIFEROL) 50 mcg (2000 units) tablet     No current facility-administered medications for this visit.      Past Medical History:   Patient Active Problem List   Diagnosis     Other acne     History reviewed. No pertinent past medical history.    CC Referred MD Juan Jose  No address on file on close of this encounter.       Again, thank you for allowing me to participate in the care of your patient.        Sincerely,        RONALDO Barry CNP

## 2022-04-01 NOTE — PATIENT INSTRUCTIONS
Wound Care Instructions     FOR SUPERFICIAL WOUNDS     [facility], [facility] or    [facility] [phone]          AFTER 24 HOURS YOU SHOULD REMOVE THE BANDAGE AND BEGIN DAILY DRESSING CHANGES AS FOLLOWS:     1) Remove Dressing.     2) Clean and dry the area with tap water using a Q-tip or sterile gauze pad.     3) Apply Vaseline, Aquaphor, Polysporin ointment or Bacitracin ointment over entire wound.  Do NOT use Neosporin ointment.     4) Cover the wound with a band-aid, or a sterile non-stick gauze pad and micropore paper tape      REPEAT THESE INSTRUCTIONS AT LEAST ONCE A DAY UNTIL THE WOUND HAS COMPLETELY HEALED.    It is an old wives tale that a wound heals better when it is exposed to air and allowed to dry out. The wound will heal faster with a better cosmetic result if it is kept moist with ointment and covered with a bandage.    **Do not let the wound dry out.**      Supplies Needed:      *Cotton tipped applicators (Q-tips)    *Polysporin Ointment or Bacitracin Ointment (NOT NEOSPORIN)    *Band-aids or non-stick gauze pads and micropore paper tape.      PATIENT INFORMATION:    During the healing process you will notice a number of changes. All wounds develop a small halo of redness surrounding the wound.  This means healing is occurring. Severe itching with extensive redness usually indicates sensitivity to the ointment or bandage tape used to dress the wound.  You should call our office if this develops.      Swelling  and/or discoloration around your surgical site is common, particularly when performed around the eye.    All wounds normally drain.  The larger the wound the more drainage there will be.  After 7-10 days, you will notice the wound beginning to shrink and new skin will begin to grow.  The wound is healed when you can see skin has formed over the entire area.  A healed wound has a healthy, shiny look to the surface and is red to dark pink in color to normalize.   Wounds may take approximately 4-6 weeks to heal.  Larger wounds may take 6-8 weeks.  After the wound is healed you may discontinue dressing changes.    You may experience a sensation of tightness as your wound heals. This is normal and will gradually subside.    Your healed wound may be sensitive to temperature changes. This sensitivity improves with time, but if you re having a lot of discomfort, try to avoid temperature extremes.    Patients frequently experience itching after their wound appears to have healed because of the continue healing under the skin.  Plain Vaseline will help relieve the itching.        POSSIBLE COMPLICATIONS    BLEEDIN. Leave the bandage in place.  2. Use tightly rolled up gauze or a cloth to apply direct pressure over the bandage for 30  minutes.  3. Reapply pressure for an additional 30 minutes if necessary  4. Use additional gauze and tape to maintain pressure once the bleeding has stopped.  5.

## 2022-04-05 LAB
PATH REPORT.COMMENTS IMP SPEC: NORMAL
PATH REPORT.COMMENTS IMP SPEC: NORMAL
PATH REPORT.FINAL DX SPEC: NORMAL
PATH REPORT.GROSS SPEC: NORMAL
PATH REPORT.MICROSCOPIC SPEC OTHER STN: NORMAL
PATH REPORT.RELEVANT HX SPEC: NORMAL

## 2022-06-30 ENCOUNTER — LAB (OUTPATIENT)
Dept: LAB | Facility: CLINIC | Age: 22
End: 2022-06-30
Payer: COMMERCIAL

## 2022-06-30 ENCOUNTER — OFFICE VISIT (OUTPATIENT)
Dept: RHEUMATOLOGY | Facility: CLINIC | Age: 22
End: 2022-06-30
Attending: FAMILY MEDICINE
Payer: COMMERCIAL

## 2022-06-30 VITALS
DIASTOLIC BLOOD PRESSURE: 85 MMHG | SYSTOLIC BLOOD PRESSURE: 121 MMHG | HEART RATE: 92 BPM | HEIGHT: 72 IN | BODY MASS INDEX: 28.51 KG/M2 | OXYGEN SATURATION: 97 % | WEIGHT: 210.5 LBS

## 2022-06-30 DIAGNOSIS — M25.562 BILATERAL CHRONIC KNEE PAIN: ICD-10-CM

## 2022-06-30 DIAGNOSIS — G89.29 BILATERAL CHRONIC KNEE PAIN: ICD-10-CM

## 2022-06-30 DIAGNOSIS — R76.8 POSITIVE ANTINUCLEAR ANTIBODY: ICD-10-CM

## 2022-06-30 DIAGNOSIS — M25.561 BILATERAL CHRONIC KNEE PAIN: ICD-10-CM

## 2022-06-30 PROBLEM — M25.50 HYPERMOBILITY ARTHRALGIA: Status: ACTIVE | Noted: 2022-06-30

## 2022-06-30 LAB — ERYTHROCYTE [SEDIMENTATION RATE] IN BLOOD BY WESTERGREN METHOD: 14 MM/HR (ref 0–20)

## 2022-06-30 PROCEDURE — 82306 VITAMIN D 25 HYDROXY: CPT

## 2022-06-30 PROCEDURE — 86235 NUCLEAR ANTIGEN ANTIBODY: CPT

## 2022-06-30 PROCEDURE — 86160 COMPLEMENT ANTIGEN: CPT | Mod: 59

## 2022-06-30 PROCEDURE — 86431 RHEUMATOID FACTOR QUANT: CPT

## 2022-06-30 PROCEDURE — 86140 C-REACTIVE PROTEIN: CPT

## 2022-06-30 PROCEDURE — 86235 NUCLEAR ANTIGEN ANTIBODY: CPT | Mod: 59

## 2022-06-30 PROCEDURE — 85652 RBC SED RATE AUTOMATED: CPT

## 2022-06-30 PROCEDURE — 99204 OFFICE O/P NEW MOD 45 MIN: CPT | Performed by: STUDENT IN AN ORGANIZED HEALTH CARE EDUCATION/TRAINING PROGRAM

## 2022-06-30 PROCEDURE — 86200 CCP ANTIBODY: CPT

## 2022-06-30 PROCEDURE — 86225 DNA ANTIBODY NATIVE: CPT

## 2022-06-30 PROCEDURE — 86160 COMPLEMENT ANTIGEN: CPT

## 2022-06-30 PROCEDURE — 36415 COLL VENOUS BLD VENIPUNCTURE: CPT

## 2022-06-30 ASSESSMENT — PAIN SCALES - GENERAL: PAINLEVEL: MODERATE PAIN (4)

## 2022-06-30 NOTE — PROGRESS NOTES
Rheumatology Clinic Visit     Silvia Cullen MRN# [id number]   YOB: 2000 Age: 22 year old     Date of Visit: Jun 30, 2022   Primary care provider: Chralie Martini          Assessment and Plan:     Assessment     Polyarthralgia x 10 years  Hypermobility  Obese  + ILA - 1:320 dense fine speckled  Neg JULIO CESAR, dsDNA, RF - 2015    Ms. Cullen is 22 year old female seen in clinic for evaluation of joint pains.     Polyarthralgia : She has on and off pain in her joints for almost 10 years. It started with knee pain but now she notice it in her hands, shoulders, neck etc. She has some stiffness in her neck and shoulders in the morning. Denies any joint swelling or deformities. No hx of dactylitis, achillis tendinitis, plantar fascitis. She has hypermobility in her joints and does meet Beighton score. No synovitis or tenderness present on joint exam.     Her clinical picture is not consistent with inflammatory arthritis. To complete work up I have ordered inflammatory markers, RF, Anti CCP, Vitamin D levels. For hypermobility recommend her to do strength training exercises, pool therapy etc to gain muscles around the joint which can make them stable.     Positive ILA : She has + ILA 1:320 dense fine speckled. Denies raynaud's, sicca symptoms, oral mucosal sores, pleurisy, photosensitivity, fever, weight loss. She does report having fatigue.  Her autoimmune testing done in 2015 did reveal negative JULIO CESAR panel, dsDNA, complement levels.  Her ILA was 1:80 homogenous at that time.  ILA can be positive in 10-12% of normal population.  I would repeat her specific serologies including JULIO CESAR panel, complements, dsDNA, centromere antibody.  If her autoimmune work-up comes back normal then likelihood of ILA associated autoimmune connective tissue disease is low.    Plan    Symptoms are most likely related to hypermobility. No inflammation noted on joint exam     Blood tests orders placed.     Follow up as needed     -- Orders placed  this encounter  Orders Placed This Encounter   Procedures     Erythrocyte sedimentation rate auto     CRP inflammation     Rheumatoid factor     Cyclic Citrullinated Peptide Antibody IgG     JULIO CESAR antibody panel     Complement C3     Complement C4     DNA ANTIBODY, NATV/2 STRAND     Centromere Antibody IgG     Vitamin D Deficiency       TT 60 min was spent on date of the encounter doing chart review, history and exam, documentation and further activities as noted above. Any prior notes, outside records, laboratory results, and imaging studies were reviewed if relevant.    Patient verbalized agreement with and understanding of the rationale for the diagnosis and treatment plan.  All questions were answered to best of my ability and the patient's satisfaction.      Chart documentation done in part with Dragon Voice recognition Software. Although reviewed after completion, some word and grammatical error may remain.                    Active Problem List:     Patient Active Problem List    Diagnosis Date Noted     Hypermobility arthralgia 06/30/2022     Priority: Medium     Other acne 03/26/2019     Priority: Medium            History of Present Illness:   Silvia Cullen is a 22 year old female with PMH of Hypermobility seen in the clinic in consultation at request of Dr. Maki for evaluation of joint pains.    Pain in her joints started in her knees almost 10 years ago. It hurt randomly, it will be hard to stand and sit up. It comes and goes and can last for a month. This year her wrists and fingers started to hurt. She sometimes have trouble writing. She has noticed pain in her neck and shoulders as well.  She cannot do exercise, if she exercise then it will  hurt more for the next couple days. She has not noticed any swelling of her joints.  She has some stiffness in her neck and shoulder in the morning. She denies history of dactylitis, plantar fasciitis or Achilles tendinitis.      She was seen by rheumatologist in  2015 as well for similar complaints.  At that time her ILA was 1:80 homogenous but specific serologies including JULIO CESAR, dsDNA, complements, rheumatoid factor were negative.  Her repeat ILA checked in 8/2021 was 1:320 dense fine speckled    She has fatigue but denies weight loss, night sweats, fever.  She has family history of psoriasis in her parents but denies personal history of psoriasis, ulcerative colitis or chron's disease. No h/o iritis, enthesitis, finger or toe swelling like dactylitis, plantar fascitis or heel pain. Denies any raynauds, malar rash, photosensitivity, recurrent mouth/genital ulcers, sicca symptoms, pleuritic chest pains, recurrent sinusitis/rhinitis, swallowing difficulty, hearing or visual changes recently. No h/o arterial/venous thrombosis in the past. Denies any tick bite, recent GI/ infection.          Review of Systems:     Review Of Systems  Constitutional: denies fever, chills, night sweats and weight loss.  Skin: No skin rash.  Eyes: No dryness or irritation in eyes. No episode of eye inflammation or redness.   Ears/Nose/Throat: no recurrent sinus infections.  Respiratory: No shortness of breath, dyspnea on exertion, cough, or hemoptysis  Cardiovascular: no chest pain or palpitations.  Gastrointestinal: no nausea, vomiting, abdominal pain.  Normal bowel movements.  Genitourinary: no dysuria, frequency  or hematuria.  Musculoskeletal: as in HPI  Neurologic: no numbness, tingling.  Psychiatric: no mood disorders.  Hematologic/Lymphatic/Immunologic: no history of easy bruising, petechia or purpura.  No abnormal bleeding.   Endocrine: no h/o thyroid disease or Diabetes.                  Past Medical History:     Past Medical History:   Diagnosis Date     Hypermobility arthralgia      Past Surgical History:   Procedure Laterality Date     NONE OF THE ABOVE CORE MEASURE DIAGNOSES              Social History:     Social History     Occupational History     Not on file   Tobacco Use      Smoking status: Never Smoker     Smokeless tobacco: Never Used   Substance and Sexual Activity     Alcohol use: Yes     Comment: rare, occasion     Drug use: No     Sexual activity: Yes     Partners: Male            Family History:     Family History   Problem Relation Age of Onset     Crohn's Disease No family hx of      Ulcerative Colitis No family hx of      GERD No family hx of      Stomach Cancer No family hx of             Allergies:   No Known Allergies         Medications:     Current Outpatient Medications   Medication Sig Dispense Refill     clindamycin (CLEOCIN T) 1 % external lotion Apply topically every morning To all areas of acne 60 mL 11     cyanocobalamin (VITAMIN B-12) 1000 MCG tablet Take 1 tablet (1,000 mcg) by mouth daily 90 tablet 1     ELURYNG 0.12-0.015 MG/24HR vaginal ring INSERT 1 VAGINAL RING BY VAGINAL ROUTE EVERY MONTH. LEAVE IN PLACE FOR 3 WEEKS REMOVE FOR 1 WEEK       fluocinonide (LIDEX) 0.05 % external solution Apply topically 2 times daily To scalp rash when flared only. 60 mL 2     ketoconazole (NIZORAL) 2 % external shampoo Use every 1-2 days when flared. Leave in few minutes before rinsing. Use twice weekly to prevent flares. 120 mL 11     spironolactone (ALDACTONE) 50 MG tablet Take 1 pill daily for 1-2 weeks then increase to 2 pills daily if well tolerated. 180 tablet 1     tretinoin (RETIN-A) 0.025 % external cream Use every night to all areas of acne 45 g 11     vitamin D3 (CHOLECALCIFEROL) 50 mcg (2000 units) tablet Take 1 tablet (50 mcg) by mouth daily 90 tablet 3     tretinoin (RETIN-A) 0.05 % external cream Apply topically At Bedtime Pea sized amount at bedtime (Patient not taking: Reported on 6/30/2022) 45 g 11            Physical Exam:   Blood pressure 121/85, pulse 92, height 1.829 m (6'), weight 95.5 kg (210 lb 8 oz), SpO2 97 %, not currently breastfeeding.  Wt Readings from Last 4 Encounters:   06/30/22 95.5 kg (210 lb 8 oz)   09/30/21 92.1 kg (203 lb)   08/17/21  92.1 kg (203 lb)   12/22/20 88.5 kg (195 lb)       Constitutional: Obese, appearing stated age; cooperative  Eyes: nl EOM, PERRLA, vision, conjunctiva, sclera  ENT: nl external ears, nose, hearing, lips, teeth, gums, throat  No mucous membrane lesions, normal saliva pool  Neck: no mass or thyroid enlargement  Resp: lungs clear to auscultation, nl to palpation  CV: RRR, no murmurs, rubs or gallops, no edema  GI: no ABD mass or tenderness, no HSM  : not tested  Lymph: no cervical, supraclavicular, inguinal or epitrochlear nodes    MS: All TMJ, neck, shoulder, elbow, wrist, MCP/PIP/DIP, spine, hip, knee, ankle, and foot MTP/IP joints were examined.   -- No active synovitis or deformity. Full ROM.  Normal  strength.     -- She does meet Beighton score. She was able to passively dorsiflex the fifth MCPs by at least 90 , oppose the thumbs to the volar aspects of the ipsilateral forearms, hyperextend the elbows by at least 10 , hyperextend the knees at least 10 , and place hands flat on the floor without bending the knees.     -- No dactylitis,  tenosynovitis, enthesopathy.    Skin: no nail pitting, alopecia, rash, nodules or lesions  Neuro: nl cranial nerves, strength, sensation, DTRs.   Psych: nl judgement, orientation, memory, affect.         Data:     No results found for any visits on 06/30/22.    Recent Labs   Lab Test 08/17/21  1606 04/30/15  1651   WBC 6.9 5.8   RBC 4.48 4.34   HGB 13.3 12.8   HCT 41.3 39.9   MCV 92 92   RDW 13.7 14.1    281   ALBUMIN 3.2* 4.1   BUN 11 21*      Recent Labs   Lab Test 08/17/21  1606 03/26/19  1705 04/30/15  1651   TSH 1.63 1.83 1.48   T4  --  0.95  --      Hemoglobin   Date Value Ref Range Status   08/17/2021 13.3 11.7 - 15.7 g/dL Final   04/30/2015 12.8 11.7 - 15.7 g/dL Final     Urea Nitrogen   Date Value Ref Range Status   08/17/2021 11 7 - 30 mg/dL Final   04/30/2015 21 (H) 7 - 19 mg/dL Final     Sed Rate   Date Value Ref Range Status   04/30/2015 9 0 - 20 mm/h  Final     AST   Date Value Ref Range Status   08/17/2021 15 0 - 45 U/L Final   04/30/2015 11 0 - 35 U/L Final     Albumin   Date Value Ref Range Status   08/17/2021 3.2 (L) 3.4 - 5.0 g/dL Final   04/30/2015 4.1 3.4 - 5.0 g/dL Final     Alkaline Phosphatase   Date Value Ref Range Status   08/17/2021 60 40 - 150 U/L Final   04/30/2015 89 40 - 150 U/L Final     ALT   Date Value Ref Range Status   08/17/2021 33 0 - 50 U/L Final   04/30/2015 20 0 - 50 U/L Final     Recent Labs   Lab Test 08/17/21  1606 03/26/19  1705 04/30/15  1651   WBC 6.9  --  5.8   HGB 13.3  --  12.8   HCT 41.3  --  39.9   MCV 92  --  92     --  281   BUN 11  --  21*   TSH 1.63 1.83 1.48   AST 15  --  11   ALT 33  --  20   ALKPHOS 60  --  89       Reviewed Rheumatology lab flowsheet    Nicolas Chun MD  [facility]  Department of Rheumatology & Autoimmune Disorders  [employer] [facility] [place]: [phone]   Pager - [phone]

## 2022-06-30 NOTE — PATIENT INSTRUCTIONS
Your symptoms are most likely related to hypermobility. No inflammation noted on joint exam     Blood tests orders placed.     Follow up as needed

## 2022-06-30 NOTE — NURSING NOTE
Chief Complaint   Patient presents with     New Patient     Bilateral chronic knee pain +1 more       Vitals:    06/30/22 1307   BP: 121/85   BP Location: Left arm   Patient Position: Sitting   Cuff Size: Adult Large   Pulse: 92   SpO2: 97%   Weight: 95.5 kg (210 lb 8 oz)   Height: 1.829 m (6')       Body mass index is 28.55 kg/m .    Erendira Huerta, [facility]

## 2022-07-01 LAB
C3 SERPL-MCNC: 148 MG/DL (ref 81–157)
C4 SERPL-MCNC: 34 MG/DL (ref 13–39)
CCP AB SER IA-ACNC: 2.1 U/ML
CENTROMERE B AB SER-ACNC: <0.7 U/ML
CENTROMERE B AB SER-ACNC: NEGATIVE
CRP SERPL-MCNC: 6.02 MG/L
DSDNA AB SER-ACNC: 1.1 IU/ML
ENA SM IGG SER IA-ACNC: 3.2 U/ML
ENA SM IGG SER IA-ACNC: NEGATIVE
ENA SS-A AB SER IA-ACNC: <0.5 U/ML
ENA SS-A AB SER IA-ACNC: NEGATIVE
ENA SS-B IGG SER IA-ACNC: <0.6 U/ML
ENA SS-B IGG SER IA-ACNC: NEGATIVE
RHEUMATOID FACT SER NEPH-ACNC: <6 IU/ML
U1 SNRNP IGG SER IA-ACNC: <1.1 U/ML
U1 SNRNP IGG SER IA-ACNC: NEGATIVE

## 2022-07-04 LAB — DEPRECATED CALCIDIOL+CALCIFEROL SERPL-MC: 24 UG/L (ref 20–75)

## 2022-09-11 ENCOUNTER — HEALTH MAINTENANCE LETTER (OUTPATIENT)
Age: 22
End: 2022-09-11

## 2022-11-02 ENCOUNTER — OFFICE VISIT (OUTPATIENT)
Dept: FAMILY MEDICINE | Facility: CLINIC | Age: 22
End: 2022-11-02
Payer: COMMERCIAL

## 2022-11-02 DIAGNOSIS — L30.1 DYSHIDROTIC ECZEMA: Primary | ICD-10-CM

## 2022-11-02 DIAGNOSIS — L70.0 ACNE VULGARIS: ICD-10-CM

## 2022-11-02 PROCEDURE — 99214 OFFICE O/P EST MOD 30 MIN: CPT | Performed by: NURSE PRACTITIONER

## 2022-11-02 RX ORDER — BETAMETHASONE DIPROPIONATE 0.5 MG/G
CREAM TOPICAL 2 TIMES DAILY
Qty: 50 G | Refills: 0 | Status: SHIPPED | OUTPATIENT
Start: 2022-11-02 | End: 2023-03-15

## 2022-11-02 ASSESSMENT — PAIN SCALES - GENERAL: PAINLEVEL: NO PAIN (0)

## 2022-11-02 NOTE — LETTER
11/2/2022         RE: Silvia Cullen  [address]  [place] [address]        Dear Colleague,    Thank you for referring your patient, Silvia Cullen, to the [facility]. Please see a copy of my visit note below.    [facility] Dermatology Note  Encounter Date: Nov 2, 2022  Office Visit     Reviewed patients past medical history and pertinent chart review prior to patients visit today.     Dermatology Problem List:  1. Acne  2. Changing mole, biopsy 04/01/22   3. Seborrheic dermatitis  4. Dyshidrotic eczema, hands and feet   ____________________________________________    Assessment & Plan:     # Acne vulgaris    -continue clindamycin lotion once daily in the am.  -continue tretinoin 0.025% cream every other night. Apply once every other day and increase to nightly as tolerate.  Waiting a few minutes after washing affected areas will decrease irritation. Method of application, side effects and expected results were discussed. The patient will apply pea size amount to the entire face, avoid areas around the eyes, corners of nose and mouth. Discussed side effects including photosensitivity and irritation.    -continue spironolactone 100 mg daily.  She may try to decrease to 50 mg daily to see if this keeps her well controlled.  If she flares she can restart 100 mg daily.  She may want to wait until she gets her birth control stable as she has stopped oral birth control and is using condoms right now until she gets an IUD.  Patient is not planning pregnancy and is preventing with condoms.  Patient is aware this medication is not safe in pregnancy.  This is a medication used in high doses for blood pressure but in lower doses for acne due to its anti-male hormone effects. Main side effects are dizziness, menstrual spotting, breast tenderness, high potassium, and can be harmful to a fetus if you were to become pregnant. It is very important to avoid pregnancy while  on this medication and to stop it immediately if you do become pregnant. Advised to stay well hydrated while on the medication and notify us if she were to feel heart palpitations. She is a young and healthy individual so we are not checking potassium levels regularly unless side effects occur.      # dishydrotic eczema    -Start betamethasone augmented formula ointment twice daily for 2-4 weeks, decreasing as patients rash improves, repeat cycle for flares. If not fully resolved in 1 month, patient advised to return to clinic for reevaluation. Discussed risk of skin atrophy with long term chronic use. Not for face, armpits or groin.   -Apply Vaseline to the entire hands and feet after medication has been applied and covered with cotton gloves and cotton socks before sleep.    Return to clinic in 1 month if dyshidrotic eczema is not fully resolved, sooner PRN for new or worsening conditions.  Okay to follow-up in 1 year if acne is still well controlled, sooner if worsening or new concerns    Ronit Quevedo, APRN CNP on 4/1/2022 at 1:47 PM   _______________________________________    CC: Acne (F/U) and Eczema (Flare up Sx x 5 months L hand/bilateral feet )    HPI:  Ms. Silvia Cullen is a(n) 22 year old female who presents today as a follow-up for acne.  I saw her on 4/1/2022 and started her on spironolactone 100 mg daily, tretinoin 0.025% cream, and clindamycin 1% lotion.  She feels like her acne is very well controlled.  She has been taking spironolactone 100 mg daily, she did try going off of this medication briefly but did start breaking out with acne so she restarted it.  She has been using the tretinoin only once or twice a week as it is very irritating to the skin.  She has been using the clindamycin 1% lotion as spot treatment.  She is very happy with her current control.    She also has started to get some rash on the palms and the soles of the feet.  She thought it was fungus at first but used antifungals  for several weeks and it did not clear.  She thinks it is dyshidrotic eczema.  She thinks this was brought on by stress of school.  It is quite itchy    She recently graduated from [employer] [employer] with her bachelors in nursing.  She is taking the NCLEX soon.     Patient is otherwise feeling well, without additional skin concerns.      Physical Exam:  Vitals: There were no vitals taken for this visit.  SKIN: Acne exam, which includes the face, neck, upper central chest, and upper central back was performed.  -Face is clear of any acne today.  -Annular red scaly discrete patches with a few deep vesicles within on the soles of both feet near the arches and some subcutaneous vesicles on the left central palm and great toes  - No other lesions of concern on areas examined.     Medications:  Current Outpatient Medications   Medication     clindamycin (CLEOCIN T) 1 % external lotion     cyanocobalamin (VITAMIN B-12) 1000 MCG tablet     ELURYNG 0.12-0.015 MG/24HR vaginal ring     fluocinonide (LIDEX) 0.05 % external solution     ketoconazole (NIZORAL) 2 % external shampoo     spironolactone (ALDACTONE) 50 MG tablet     tretinoin (RETIN-A) 0.025 % external cream     tretinoin (RETIN-A) 0.05 % external cream     vitamin D3 (CHOLECALCIFEROL) 50 mcg (2000 units) tablet     No current facility-administered medications for this visit.      Past Medical History:   Patient Active Problem List   Diagnosis     Other acne     Hypermobility arthralgia     Past Medical History:   Diagnosis Date     Hypermobility arthralgia        CC Referred Self, MD  No address on file on close of this encounter.         Again, thank you for allowing me to participate in the care of your patient.        Sincerely,        Ronit Quevedo, RONALDO CNP

## 2022-11-02 NOTE — PROGRESS NOTES
[facility] Dermatology Note  Encounter Date: Nov 2, 2022  Office Visit     Reviewed patients past medical history and pertinent chart review prior to patients visit today.     Dermatology Problem List:  1. Acne  2. Changing mole, biopsy 04/01/22   3. Seborrheic dermatitis  4. Dyshidrotic eczema, hands and feet   ____________________________________________    Assessment & Plan:     # Acne vulgaris    -continue clindamycin lotion once daily in the am.  -continue tretinoin 0.025% cream every other night. Apply once every other day and increase to nightly as tolerate.  Waiting a few minutes after washing affected areas will decrease irritation. Method of application, side effects and expected results were discussed. The patient will apply pea size amount to the entire face, avoid areas around the eyes, corners of nose and mouth. Discussed side effects including photosensitivity and irritation.    -continue spironolactone 100 mg daily.  She may try to decrease to 50 mg daily to see if this keeps her well controlled.  If she flares she can restart 100 mg daily.  She may want to wait until she gets her birth control stable as she has stopped oral birth control and is using condoms right now until she gets an IUD.  Patient is not planning pregnancy and is preventing with condoms.  Patient is aware this medication is not safe in pregnancy.  This is a medication used in high doses for blood pressure but in lower doses for acne due to its anti-male hormone effects. Main side effects are dizziness, menstrual spotting, breast tenderness, high potassium, and can be harmful to a fetus if you were to become pregnant. It is very important to avoid pregnancy while on this medication and to stop it immediately if you do become pregnant. Advised to stay well hydrated while on the medication and notify us if she were to feel heart palpitations. She is a young and healthy individual so we are not checking potassium  levels regularly unless side effects occur.      # dishydrotic eczema    -Start betamethasone augmented formula ointment twice daily for 2-4 weeks, decreasing as patients rash improves, repeat cycle for flares. If not fully resolved in 1 month, patient advised to return to clinic for reevaluation. Discussed risk of skin atrophy with long term chronic use. Not for face, armpits or groin.   -Apply Vaseline to the entire hands and feet after medication has been applied and covered with cotton gloves and cotton socks before sleep.    Return to clinic in 1 month if dyshidrotic eczema is not fully resolved, sooner PRN for new or worsening conditions.  Okay to follow-up in 1 year if acne is still well controlled, sooner if worsening or new concerns    Ronit Quevedo, APRN CNP on 4/1/2022 at 1:47 PM   _______________________________________    CC: Acne (F/U) and Eczema (Flare up Sx x 5 months L hand/bilateral feet )    HPI:  Ms. Silvia Cullen is a(n) 22 year old female who presents today as a follow-up for acne.  I saw her on 4/1/2022 and started her on spironolactone 100 mg daily, tretinoin 0.025% cream, and clindamycin 1% lotion.  She feels like her acne is very well controlled.  She has been taking spironolactone 100 mg daily, she did try going off of this medication briefly but did start breaking out with acne so she restarted it.  She has been using the tretinoin only once or twice a week as it is very irritating to the skin.  She has been using the clindamycin 1% lotion as spot treatment.  She is very happy with her current control.    She also has started to get some rash on the palms and the soles of the feet.  She thought it was fungus at first but used antifungals for several weeks and it did not clear.  She thinks it is dyshidrotic eczema.  She thinks this was brought on by stress of school.  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  She is taking the NCLEX  soon.     Patient is otherwise feeling well, without additional skin concerns.      Physical Exam:  Vitals: There were no vitals taken for this visit.  SKIN: Acne exam, which includes the face, neck, upper central chest, and upper central back was performed.  -Face is clear of any acne today.  -Annular red scaly discrete patches with a few deep vesicles within on the soles of both feet near the arches and some subcutaneous vesicles on the left central palm and great toes  - No other lesions of concern on areas examined.     Medications:  Current Outpatient Medications   Medication     clindamycin (CLEOCIN T) 1 % external lotion     cyanocobalamin (VITAMIN B-12) 1000 MCG tablet     ELURYNG 0.12-0.015 MG/24HR vaginal ring     fluocinonide (LIDEX) 0.05 % external solution     ketoconazole (NIZORAL) 2 % external shampoo     spironolactone (ALDACTONE) 50 MG tablet     tretinoin (RETIN-A) 0.025 % external cream     tretinoin (RETIN-A) 0.05 % external cream     vitamin D3 (CHOLECALCIFEROL) 50 mcg (2000 units) tablet     No current facility-administered medications for this visit.      Past Medical History:   Patient Active Problem List   Diagnosis     Other acne     Hypermobility arthralgia     Past Medical History:   Diagnosis Date     Hypermobility arthralgia        CC Referred Self, MD  No address on file on close of this encounter.

## 2022-11-25 ENCOUNTER — E-VISIT (OUTPATIENT)
Dept: FAMILY MEDICINE | Facility: CLINIC | Age: 22
End: 2022-11-25
Payer: COMMERCIAL

## 2022-11-25 DIAGNOSIS — R06.02 SOB (SHORTNESS OF BREATH): Primary | ICD-10-CM

## 2022-11-25 PROCEDURE — 99207 PR NON-BILLABLE SERV PER CHARTING: CPT | Performed by: FAMILY MEDICINE

## 2022-11-28 ENCOUNTER — TELEPHONE (OUTPATIENT)
Dept: FAMILY MEDICINE | Facility: CLINIC | Age: 22
End: 2022-11-28

## 2022-11-28 NOTE — TELEPHONE ENCOUNTER
Patient Contact    Attempt # 1    Was Call answered? No    Non-detailed voicemail left to call clinic at: [phone].     On Call Back:    Patient submitted an E-Visit for cough on 11/25 that was missed. On Call back please see if patient was able to receive care or if she needs to be seen. Triage patient if needed. Dr. Maki stated that she can submit another E-Visit if needed.    Jihan GARCIA RN  [facility] Triage Team

## 2022-11-29 NOTE — TELEPHONE ENCOUNTER
Pt says cough is improving but still is present. Pt will submit evisit per provider recommendation.    Adeline PERES RN  EP Triage

## 2023-01-11 ENCOUNTER — OFFICE VISIT (OUTPATIENT)
Dept: FAMILY MEDICINE | Facility: CLINIC | Age: 23
End: 2023-01-11
Payer: COMMERCIAL

## 2023-01-11 VITALS
WEIGHT: 226 LBS | TEMPERATURE: 98 F | HEIGHT: 72 IN | BODY MASS INDEX: 30.61 KG/M2 | DIASTOLIC BLOOD PRESSURE: 83 MMHG | HEART RATE: 84 BPM | SYSTOLIC BLOOD PRESSURE: 129 MMHG | OXYGEN SATURATION: 98 %

## 2023-01-11 DIAGNOSIS — Z53.9 ERRONEOUS ENCOUNTER--DISREGARD: Primary | ICD-10-CM

## 2023-01-11 DIAGNOSIS — R30.0 DYSURIA: Primary | ICD-10-CM

## 2023-01-11 LAB
ALBUMIN UR-MCNC: NEGATIVE MG/DL
APPEARANCE UR: CLEAR
BILIRUB UR QL STRIP: NEGATIVE
COLOR UR AUTO: YELLOW
GLUCOSE UR STRIP-MCNC: NEGATIVE MG/DL
HGB UR QL STRIP: NEGATIVE
KETONES UR STRIP-MCNC: NEGATIVE MG/DL
LEUKOCYTE ESTERASE UR QL STRIP: NEGATIVE
NITRATE UR QL: NEGATIVE
PH UR STRIP: 6 [PH] (ref 5–7)
SP GR UR STRIP: 1.01 (ref 1–1.03)
UROBILINOGEN UR STRIP-ACNC: 0.2 E.U./DL

## 2023-01-11 PROCEDURE — 99213 OFFICE O/P EST LOW 20 MIN: CPT | Performed by: PHYSICIAN ASSISTANT

## 2023-01-11 PROCEDURE — 81003 URINALYSIS AUTO W/O SCOPE: CPT | Performed by: PHYSICIAN ASSISTANT

## 2023-01-11 RX ORDER — CIPROFLOXACIN 500 MG/1
500 TABLET, FILM COATED ORAL 2 TIMES DAILY
Qty: 14 TABLET | Refills: 0 | Status: SHIPPED | OUTPATIENT
Start: 2023-01-11 | End: 2023-01-18

## 2023-01-11 ASSESSMENT — PAIN SCALES - GENERAL: PAINLEVEL: MILD PAIN (2)

## 2023-01-11 NOTE — PROGRESS NOTES
Assessment & Plan     Dysuria  Appears well at today's visit.  Physical exam unremarkable.  No CVA tenderness.  Afebrile.  Urinalysis was negative for leukocytes/nitrates.  No blood in the urine.  Discussed these findings with the patient.  Happy that her symptoms have improved.  However, given that she is going out of town on a road trip we will send her with Cipro 500 mg twice daily X 7 days if symptoms return.  Encouraged her to seek care if symptoms were to return.  Push fluids still.  She is comfortable with this plan.  Has tolerated Cipro well in the past.  Discussed side effects of fluoroquinolones.    - UA reflex to Microscopic and Culture  - ciprofloxacin (CIPRO) 500 MG tablet  Dispense: 14 tablet; Refill: 0    20 minutes spent on the date of the encounter doing chart review, review of test results, interpretation of tests, patient visit and documentation        Return if symptoms worsen or fail to improve.    The likelihood of other entities in the differential is insufficient to justify any further testing for them at this time. This was explained to the patient. The patient was advised that persistent or worsening symptoms would require further evaluation. Patient advised to call the office and if unable to reach to go to the emergency room if they develop any new or worsening symptoms. Expressed understanding and agreement with above stated plan.     REBEKAH Martinez [facility] DOROTA Vega is a 22 year old, presenting for the following health issues:  UTI    2-week history of urinary frequency/urgency.  History of urinary tract infection.  Last one approximately 3 years ago.  Feels similar.  Stated that on 1/7/2023 associated right-sided flank pain, nausea and questionable sweats?  Pushed fluid.  Took Tylenol. Symptoms resolved without reoccurrence. Freq/urgency improved as well over the past few days. Going out of town on a roadtrip tomorrow.     UTI    History  of Present Illness       Reason for visit:  Possible UTI  Symptom onset:  1-2 weeks ago  Symptoms include:  Urgency and frequency in urination, pain in back lower side  Symptom intensity:  Moderate  Symptom progression:  Improving  Had these symptoms before:  Yes  Has tried/received treatment for these symptoms:  Yes  Previous treatment was successful:  Yes  Prior treatment description:  Antibiotics  What makes it worse:  No  What makes it better:  No    She eats 2-3 servings of fruits and vegetables daily.She consumes 1 sweetened beverage(s) daily.She exercises with enough effort to increase her heart rate 10 to 19 minutes per day.  She exercises with enough effort to increase her heart rate 3 or less days per week.   She is taking medications regularly.       Review of Systems   Constitutional, HEENT, cardiovascular, pulmonary, GI, , musculoskeletal, neuro, skin, endocrine and psych systems are negative, except as otherwise noted.      Objective    /83   Pulse 84   Temp 98  F (36.7  C) (Oral)   Ht 1.829 m (6')   Wt 102.5 kg (226 lb)   SpO2 98%   BMI 30.65 kg/m    Body mass index is 30.65 kg/m .     No Known Allergies  Current Outpatient Medications   Medication Sig Dispense Refill     augmented betamethasone dipropionate (DIPROLENE AF) 0.05 % external cream Apply topically 2 times daily To rash on palms and feet until clear then stop 50 g 0     ciprofloxacin (CIPRO) 500 MG tablet Take 1 tablet (500 mg) by mouth 2 times daily for 7 days 14 tablet 0     clindamycin (CLEOCIN T) 1 % external lotion Apply topically every morning To all areas of acne 60 mL 11     cyanocobalamin (VITAMIN B-12) 1000 MCG tablet Take 1 tablet (1,000 mcg) by mouth daily 90 tablet 1     ELURYNG 0.12-0.015 MG/24HR vaginal ring INSERT 1 VAGINAL RING BY VAGINAL ROUTE EVERY MONTH. LEAVE IN PLACE FOR 3 WEEKS REMOVE FOR 1 WEEK       fluocinonide (LIDEX) 0.05 % external solution Apply topically 2 times daily To scalp rash when flared  only. 60 mL 2     ketoconazole (NIZORAL) 2 % external shampoo Use every 1-2 days when flared. Leave in few minutes before rinsing. Use twice weekly to prevent flares. 120 mL 11     spironolactone (ALDACTONE) 50 MG tablet Take 1 pill daily for 1-2 weeks then increase to 2 pills daily if well tolerated. 180 tablet 1     tretinoin (RETIN-A) 0.025 % external cream Use every night to all areas of acne 45 g 11     tretinoin (RETIN-A) 0.05 % external cream Apply topically At Bedtime Pea sized amount at bedtime 45 g 11     vitamin D3 (CHOLECALCIFEROL) 50 mcg (2000 units) tablet Take 1 tablet (50 mcg) by mouth daily 90 tablet 3     Past Medical History:   Diagnosis Date     Hypermobility arthralgia      Past Surgical History:   Procedure Laterality Date     NONE OF THE ABOVE CORE MEASURE DIAGNOSES         Physical Exam   GENERAL: healthy, alert and no distress  EYES: Eyes grossly normal to inspection, PERRL and conjunctivae and sclerae normal  NECK: no adenopathy, no asymmetry, masses, or scars and thyroid normal to palpation  RESP: lungs clear to auscultation - no rales, rhonchi or wheezes  CV: regular rate and rhythm, normal S1 S2, no S3 or S4, no murmur, click or rub, no peripheral edema and peripheral pulses strong  ABDOMEN: soft, nontender, no hepatosplenomegaly, no masses and bowel sounds normal.  Negative CVA tenderness.  MS: no gross musculoskeletal defects noted, no edema  SKIN: no suspicious lesions or rashes  NEURO: Normal strength and tone, mentation intact and speech normal  PSYCH: mentation appears normal, affect normal/bright

## 2023-01-22 ENCOUNTER — HEALTH MAINTENANCE LETTER (OUTPATIENT)
Age: 23
End: 2023-01-22

## 2023-01-24 PROBLEM — M54.9 UPPER BACK PAIN: Status: ACTIVE | Noted: 2018-04-17

## 2023-01-24 PROBLEM — Z30.41 USES ORAL CONTRACEPTION: Status: ACTIVE | Noted: 2018-04-16

## 2023-01-24 PROBLEM — I34.0 MILD MITRAL REGURGITATION: Status: ACTIVE | Noted: 2018-04-23

## 2023-01-24 PROBLEM — R10.13 ABDOMINAL PAIN, EPIGASTRIC: Status: ACTIVE | Noted: 2018-04-16

## 2023-02-16 DIAGNOSIS — L70.0 ACNE VULGARIS: ICD-10-CM

## 2023-02-16 RX ORDER — SPIRONOLACTONE 50 MG/1
TABLET, FILM COATED ORAL
Qty: 180 TABLET | Refills: 1 | Status: SHIPPED | OUTPATIENT
Start: 2023-02-16 | End: 2023-02-16

## 2023-02-16 RX ORDER — SPIRONOLACTONE 50 MG/1
100 TABLET, FILM COATED ORAL DAILY
Qty: 180 TABLET | Refills: 2 | Status: SHIPPED | OUTPATIENT
Start: 2023-02-16 | End: 2023-03-15

## 2023-02-16 NOTE — TELEPHONE ENCOUNTER
Routing refill request to provider for review/approval because:  Labs not current    Iva GUNN RN  [facility] [place]  [phone]

## 2023-03-15 ENCOUNTER — OFFICE VISIT (OUTPATIENT)
Dept: FAMILY MEDICINE | Facility: CLINIC | Age: 23
End: 2023-03-15
Payer: COMMERCIAL

## 2023-03-15 DIAGNOSIS — L30.1 DYSHIDROTIC ECZEMA: Primary | ICD-10-CM

## 2023-03-15 DIAGNOSIS — L70.0 ACNE VULGARIS: ICD-10-CM

## 2023-03-15 DIAGNOSIS — L21.9 SEBORRHEIC DERMATITIS: ICD-10-CM

## 2023-03-15 LAB
KOH PREPARATION: NORMAL
KOH PREPARATION: NORMAL

## 2023-03-15 PROCEDURE — 99214 OFFICE O/P EST MOD 30 MIN: CPT | Performed by: NURSE PRACTITIONER

## 2023-03-15 PROCEDURE — 87220 TISSUE EXAM FOR FUNGI: CPT | Performed by: NURSE PRACTITIONER

## 2023-03-15 RX ORDER — BETAMETHASONE DIPROPIONATE 0.5 MG/G
CREAM TOPICAL 2 TIMES DAILY
Qty: 50 G | Refills: 0 | Status: SHIPPED | OUTPATIENT
Start: 2023-03-15

## 2023-03-15 RX ORDER — CLINDAMYCIN PHOSPHATE 10 UG/ML
LOTION TOPICAL EVERY MORNING
Qty: 60 ML | Refills: 11 | Status: SHIPPED | OUTPATIENT
Start: 2023-03-15

## 2023-03-15 RX ORDER — TRETINOIN 0.25 MG/G
CREAM TOPICAL
Qty: 45 G | Refills: 11 | Status: SHIPPED | OUTPATIENT
Start: 2023-03-15

## 2023-03-15 RX ORDER — SPIRONOLACTONE 50 MG/1
150 TABLET, FILM COATED ORAL DAILY
Qty: 270 TABLET | Refills: 3 | Status: SHIPPED | OUTPATIENT
Start: 2023-03-15

## 2023-03-15 RX ORDER — FLUOCINONIDE TOPICAL SOLUTION USP, 0.05% 0.5 MG/ML
SOLUTION TOPICAL 2 TIMES DAILY
Qty: 60 ML | Refills: 2 | Status: SHIPPED | OUTPATIENT
Start: 2023-03-15

## 2023-03-15 RX ORDER — KETOCONAZOLE 20 MG/ML
SHAMPOO TOPICAL
Qty: 120 ML | Refills: 11 | Status: SHIPPED | OUTPATIENT
Start: 2023-03-15

## 2023-03-15 RX ORDER — TRETINOIN 0.5 MG/G
CREAM TOPICAL AT BEDTIME
Qty: 45 G | Refills: 11 | Status: SHIPPED | OUTPATIENT
Start: 2023-03-15

## 2023-03-15 ASSESSMENT — PAIN SCALES - GENERAL: PAINLEVEL: EXTREME PAIN (8)

## 2023-03-15 NOTE — PROGRESS NOTES
[facility] Dermatology Note  Encounter Date: Mar 15, 2023  Office Visit     Reviewed patients past medical history and pertinent chart review prior to patients visit today.     Dermatology Problem List:  1. Acne, on clindamycin lotion, tretinoin 0.05% cream, and spironolactone 150 mg daily  2. Seborrheic dermatitis ketoconazole 2% shampoo and fluocinonide solution  3. Dyshidrotic eczema, hands and feet betamethasone augmented formula  0.05% ointment and narrowband phototherapy started  3/15/2023     ____________________________________________    Assessment & Plan:        # Acne vulgaris, flared     -continue clindamycin lotion once daily in the am.  -continue tretinoin 0.025% cream to upper face, and also given tretinoin 0.05% cream for lower face, chest and back affected areasevery other night. Apply once every other day and increase to nightly as tolerate.  Waiting a few minutes after washing affected areas will decrease irritation. Method of application, side effects and expected results were discussed. The patient will apply pea size amount to the entire face, avoid areas around the eyes, corners of nose and mouth. Discussed side effects including photosensitivity and irritation.    -increase dosage to spironolactone 150 mg daily.   Patient is not planning pregnancy and is preventing with condoms and birth control.  Patient is aware this medication is not safe in pregnancy.  This is a medication used in high doses for blood pressure but in lower doses for acne due to its anti-male hormone effects. Main side effects are dizziness, menstrual spotting, breast tenderness, high potassium, and can be harmful to a fetus if you were to become pregnant. It is very important to avoid pregnancy while on this medication and to stop it immediately if you do become pregnant. Advised to stay well hydrated while on the medication and notify us if she were to feel heart palpitations. She is a young and  healthy individual so we are not checking potassium levels regularly unless side effects occur.       # dishydrotic eczema, not improved  -KOH scraping sent to lab, negative today.   -Restart betamethasone augmented formula ointment twice daily for 2-4 weeks, decreasing as patients rash improves, repeat cycle for flares. If not fully resolved in 1 month, patient advised to return to clinic for reevaluation. Discussed risk of skin atrophy with long term chronic use. Not for face, armpits or groin.   -Apply Vaseline to the entire hands and feet after medication has been applied and covered with cotton gloves and cotton socks before sleep.  -start nbUVB phototherapy three times weekly. Order placed    # Seborrheic dermatitis, scalp. Discussed that this is a recurring rash for most people and will need some maintenance even after rash has resolved. Given prescription refill for ketoconazole 2% shampoo to use every 1-2 days while rash is present, and ongoing 1-3 times weekly when rash is well controlled. Advised to leave on for 2-5 minutes before rinsing. Also given fluocinonide 0.05% solution to use 1-2 times daily when rash is itchy. Stop use when rash is not symptomatic and use for flares only. Advised to avoid face, groin and skin folds. Councled about use and side effects of thinning of the skin, striae, erythema, and worsening of rash.         Ronit Quevedo, APRN CNP on 3/15/2023 at 9:33 AM   _______________________________________    CC: Eczema (Sx continued with worsening Sx on bilateral hands/ feet. Given Rx that  is not working. Also with bleeding/peeling/ cracking. Very painful)    HPI:  Ms. Silvia Cullen is a(n) 23 year old female who presents today for follow-up  for seborrheic dermatitis, acne, and decided eczema.  She says the seborrheic dermatitis was well controlled while she was on the ketoconazole fluocinonide but she stopped this because it was gone and now it has come back.  She does not know  what to do now.  She also is flaring with her acne and wants to adjust her treatment therapy.  She is using tretinoin 0.025% and feels like it is not drying in the lower face but it is on the upper face.  She is not using it on the chest or back either.  Her hands and feet she feels like goes in cycles of getting better and worse but does not feel like it has anything to do with the betamethasone she has.    Patient is otherwise feeling well, without additional skin concerns.      Physical Exam:  SKIN: Focused examination of face, chest, back, hands, feet was performed.  - a few inflammatory papules, comedones and postinflamtory purpura on lower cheeks and jawline, chest and upper back  -hand have mild erythemaotus slightly lux papules on bilateral palms  -plantar and latearal feet have thick crust and erythema and superficial fissures  -patchy erythema and difusse fine scaling of the scalp    - No other lesions of concern on areas examined.     Medications:  Current Outpatient Medications   Medication     augmented betamethasone dipropionate (DIPROLENE AF) 0.05 % external cream     clindamycin (CLEOCIN T) 1 % external lotion     cyanocobalamin (VITAMIN B-12) 1000 MCG tablet     ELURYNG 0.12-0.015 MG/24HR vaginal ring     fluocinonide (LIDEX) 0.05 % external solution     ketoconazole (NIZORAL) 2 % external shampoo     spironolactone (ALDACTONE) 50 MG tablet     tretinoin (RETIN-A) 0.025 % external cream     tretinoin (RETIN-A) 0.05 % external cream     vitamin D3 (CHOLECALCIFEROL) 50 mcg (2000 units) tablet     No current facility-administered medications for this visit.      Past Medical History:   Patient Active Problem List   Diagnosis     Other acne     Hypermobility arthralgia     Uses oral contraception     Upper back pain     Mild mitral regurgitation     Abdominal pain, epigastric     Acne     Past Medical History:   Diagnosis Date     Hypermobility arthralgia        CC No referring provider defined for  this encounter. on close of this encounter.

## 2023-03-15 NOTE — LETTER
3/15/2023         RE: Silvia Cullen  [address]  [place] [address]        Dear Colleague,    Thank you for referring your patient, Silvia Cullen, to the [facility] [place]. Please see a copy of my visit note below.    [facility] Dermatology Note  Encounter Date: Mar 15, 2023  Office Visit     Reviewed patients past medical history and pertinent chart review prior to patients visit today.     Dermatology Problem List:  1. Acne, on clindamycin lotion, tretinoin 0.05% cream, and spironolactone 150 mg daily  2. Seborrheic dermatitis ketoconazole 2% shampoo and fluocinonide solution  3. Dyshidrotic eczema, hands and feet betamethasone augmented formula  0.05% ointment and narrowband phototherapy started  3/15/2023     ____________________________________________    Assessment & Plan:        # Acne vulgaris, flared     -continue clindamycin lotion once daily in the am.  -continue tretinoin 0.025% cream to upper face, and also given tretinoin 0.05% cream for lower face, chest and back affected areasevery other night. Apply once every other day and increase to nightly as tolerate.  Waiting a few minutes after washing affected areas will decrease irritation. Method of application, side effects and expected results were discussed. The patient will apply pea size amount to the entire face, avoid areas around the eyes, corners of nose and mouth. Discussed side effects including photosensitivity and irritation.    -increase dosage to spironolactone 150 mg daily.   Patient is not planning pregnancy and is preventing with condoms and birth control.  Patient is aware this medication is not safe in pregnancy.  This is a medication used in high doses for blood pressure but in lower doses for acne due to its anti-male hormone effects. Main side effects are dizziness, menstrual spotting, breast tenderness, high potassium, and can be harmful to a fetus if you were to become  pregnant. It is very important to avoid pregnancy while on this medication and to stop it immediately if you do become pregnant. Advised to stay well hydrated while on the medication and notify us if she were to feel heart palpitations. She is a young and healthy individual so we are not checking potassium levels regularly unless side effects occur.       # dishydrotic eczema, not improved  -KOH scraping sent to lab, negative today.   -Restart betamethasone augmented formula ointment twice daily for 2-4 weeks, decreasing as patients rash improves, repeat cycle for flares. If not fully resolved in 1 month, patient advised to return to clinic for reevaluation. Discussed risk of skin atrophy with long term chronic use. Not for face, armpits or groin.   -Apply Vaseline to the entire hands and feet after medication has been applied and covered with cotton gloves and cotton socks before sleep.  -start nbUVB phototherapy three times weekly. Order placed    # Seborrheic dermatitis, scalp. Discussed that this is a recurring rash for most people and will need some maintenance even after rash has resolved. Given prescription refill for ketoconazole 2% shampoo to use every 1-2 days while rash is present, and ongoing 1-3 times weekly when rash is well controlled. Advised to leave on for 2-5 minutes before rinsing. Also given fluocinonide 0.05% solution to use 1-2 times daily when rash is itchy. Stop use when rash is not symptomatic and use for flares only. Advised to avoid face, groin and skin folds. Councled about use and side effects of thinning of the skin, striae, erythema, and worsening of rash.         Ronit Quevedo, APRN CNP on 3/15/2023 at 9:33 AM   _______________________________________    CC: Eczema (Sx continued with worsening Sx on bilateral hands/ feet. Given Rx that  is not working. Also with bleeding/peeling/ cracking. Very painful)    HPI:  Ms. Silvia Cullen is a(n) 23 year old female who presents today for  follow-up  for seborrheic dermatitis, acne, and decided eczema.  She says the seborrheic dermatitis was well controlled while she was on the ketoconazole fluocinonide but she stopped this because it was gone and now it has come back.  She does not know what to do now.  She also is flaring with her acne and wants to adjust her treatment therapy.  She is using tretinoin 0.025% and feels like it is not drying in the lower face but it is on the upper face.  She is not using it on the chest or back either.  Her hands and feet she feels like goes in cycles of getting better and worse but does not feel like it has anything to do with the betamethasone she has.    Patient is otherwise feeling well, without additional skin concerns.      Physical Exam:  SKIN: Focused examination of face, chest, back, hands, feet was performed.  - a few inflammatory papules, comedones and postinflamtory purpura on lower cheeks and jawline, chest and upper back  -hand have mild erythemaotus slightly lux papules on bilateral palms  -plantar and latearal feet have thick crust and erythema and superficial fissures  -patchy erythema and difusse fine scaling of the scalp    - No other lesions of concern on areas examined.     Medications:  Current Outpatient Medications   Medication     augmented betamethasone dipropionate (DIPROLENE AF) 0.05 % external cream     clindamycin (CLEOCIN T) 1 % external lotion     cyanocobalamin (VITAMIN B-12) 1000 MCG tablet     ELURYNG 0.12-0.015 MG/24HR vaginal ring     fluocinonide (LIDEX) 0.05 % external solution     ketoconazole (NIZORAL) 2 % external shampoo     spironolactone (ALDACTONE) 50 MG tablet     tretinoin (RETIN-A) 0.025 % external cream     tretinoin (RETIN-A) 0.05 % external cream     vitamin D3 (CHOLECALCIFEROL) 50 mcg (2000 units) tablet     No current facility-administered medications for this visit.      Past Medical History:   Patient Active Problem List   Diagnosis     Other acne      Hypermobility arthralgia     Uses oral contraception     Upper back pain     Mild mitral regurgitation     Abdominal pain, epigastric     Acne     Past Medical History:   Diagnosis Date     Hypermobility arthralgia        CC No referring provider defined for this encounter. on close of this encounter.       Again, thank you for allowing me to participate in the care of your patient.        Sincerely,        RONALDO Barry CNP

## 2023-03-15 NOTE — PATIENT INSTRUCTIONS
You have been prescribed narrow band UVB phototherapy (light treatments) to treat your rash. Please check insurance coverage prior to scheduling appointments to make sure this is a covered service.     Insurance will want to know your:  Diagnosis code: L30.1 (dyshidrotic eczema)  Procedure code:  98693 (narrow band UVB phototherapy)     To set up photo therapy appointments please call [phone]       mineral oil and bring to your appointments to apply to affected areas just prior to doing the light treatment.

## 2023-08-10 ENCOUNTER — OFFICE VISIT (OUTPATIENT)
Dept: URGENT CARE | Facility: URGENT CARE | Age: 23
End: 2023-08-10
Payer: COMMERCIAL

## 2023-08-10 VITALS
DIASTOLIC BLOOD PRESSURE: 87 MMHG | OXYGEN SATURATION: 98 % | SYSTOLIC BLOOD PRESSURE: 137 MMHG | HEART RATE: 97 BPM | TEMPERATURE: 98.3 F

## 2023-08-10 DIAGNOSIS — J01.90 ACUTE NON-RECURRENT SINUSITIS, UNSPECIFIED LOCATION: Primary | ICD-10-CM

## 2023-08-10 LAB — SARS-COV-2 RNA RESP QL NAA+PROBE: POSITIVE

## 2023-08-10 PROCEDURE — 87635 SARS-COV-2 COVID-19 AMP PRB: CPT | Performed by: PHYSICIAN ASSISTANT

## 2023-08-10 PROCEDURE — 99213 OFFICE O/P EST LOW 20 MIN: CPT | Performed by: PHYSICIAN ASSISTANT

## 2023-08-10 ASSESSMENT — ENCOUNTER SYMPTOMS
SINUS PAIN: 1
HEADACHES: 1
SINUS PRESSURE: 1
FEVER: 1
COUGH: 1
SHORTNESS OF BREATH: 0
RHINORRHEA: 1

## 2023-08-10 NOTE — PROGRESS NOTES
SUBJECTIVE:   Silvia Cullen is a 23 year old female presenting with a chief complaint of   Chief Complaint   Patient presents with    Ear Problem     Bilateral ear pain X1 week. Sinus congestion, fever, cough.       She is an established patient of [facility].  Patient presents with complaints of sinus pressure, bilateral ear pain, productive cough and fever.  Tmax 102.  Treatment with otc steroid nasal spray.  No hx of sinus surgery.  Symptoms x 7 days.      Review of Systems   Constitutional:  Positive for fever.   HENT:  Positive for congestion, ear pain, rhinorrhea, sinus pressure and sinus pain.    Respiratory:  Positive for cough. Negative for shortness of breath.    Neurological:  Positive for headaches.   All other systems reviewed and are negative.      Past Medical History:   Diagnosis Date    Hypermobility arthralgia      Family History   Problem Relation Age of Onset    Crohn's Disease No family hx of     Ulcerative Colitis No family hx of     GERD No family hx of     Stomach Cancer No family hx of      Current Outpatient Medications   Medication Sig Dispense Refill    amoxicillin-clavulanate (AUGMENTIN) 875-125 MG tablet Take 1 tablet by mouth 2 times daily for 7 days 14 tablet 0    augmented betamethasone dipropionate (DIPROLENE AF) 0.05 % external cream Apply topically 2 times daily To rash on palms and feet until clear then stop 50 g 0    clindamycin (CLEOCIN T) 1 % external lotion Apply topically every morning To all areas of acne 60 mL 11    cyanocobalamin (VITAMIN B-12) 1000 MCG tablet Take 1 tablet (1,000 mcg) by mouth daily 90 tablet 1    ELURYNG 0.12-0.015 MG/24HR vaginal ring INSERT 1 VAGINAL RING BY VAGINAL ROUTE EVERY MONTH. LEAVE IN PLACE FOR 3 WEEKS REMOVE FOR 1 WEEK      fluocinonide (LIDEX) 0.05 % external solution Apply topically 2 times daily To scalp rash when flared only. 60 mL 2    ketoconazole (NIZORAL) 2 % external shampoo Use every 1-2 days when flared. Leave in few minutes before  "rinsing. Use twice weekly to prevent flares. 120 mL 11    spironolactone (ALDACTONE) 50 MG tablet Take 3 tablets (150 mg) by mouth daily 270 tablet 3    tretinoin (RETIN-A) 0.025 % external cream Use every night to all \"sensitive skin\" areas of acne 45 g 11    tretinoin (RETIN-A) 0.05 % external cream Apply topically At Bedtime To lower face/jawline and affected body areas for acne 45 g 11    vitamin D3 (CHOLECALCIFEROL) 50 mcg (2000 units) tablet Take 1 tablet (50 mcg) by mouth daily 90 tablet 3     Social History     Tobacco Use    Smoking status: Never    Smokeless tobacco: Never   Substance Use Topics    Alcohol use: Yes     Comment: rare, occasion       OBJECTIVE  /87   Pulse 97   Temp 98.3  F (36.8  C) (Tympanic)   SpO2 98%     Physical Exam    Labs:  No results found for this or any previous visit (from the past 24 hour(s)).    X-Ray was not done.    ASSESSMENT:      ICD-10-CM    1. Acute non-recurrent sinusitis, unspecified location  J01.90 amoxicillin-clavulanate (AUGMENTIN) 875-125 MG tablet           Medical Decision Making:    Differential Diagnosis:  URI Adult/Peds:  Acute right otitis media, Acute left otitis media, and Sinusitis    Serious Comorbid Conditions:  Adult:   reviewed    PLAN:    Rx for augmentin.  Continue with flonase.  Discussed reasons to seek immediate medical attention.  Additionally if no improvement or worsening in one week, may follow up with PCP and/or UC.    Discussed and recommended CDC guidelines for self isolation.  COVID test pending.          Followup:    If not improving or if condition worsens, follow up with your Primary Care Provider, If not improving or if conditions worsens over the next 12-24 hours, go to the Emergency Department    There are no Patient Instructions on file for this visit.      " Mastoid Interpolation Flap Text: A decision was made to reconstruct the defect utilizing an interpolation axial flap and a staged reconstruction.  A telfa template was made of the defect.  This telfa template was then used to outline the mastoid interpolation flap.  The donor area for the pedicle flap was then injected with anesthesia.  The flap was excised through the skin and subcutaneous tissue down to the layer of the underlying musculature.  The pedicle flap was carefully excised within this deep plane to maintain its blood supply.  The edges of the donor site were undermined.   The donor site was closed in a primary fashion.  The pedicle was then rotated into position and sutured.  Once the tube was sutured into place, adequate blood supply was confirmed with blanching and refill.  The pedicle was then wrapped with xeroform gauze and dressed appropriately with a telfa and gauze bandage to ensure continued blood supply and protect the attached pedicle.

## 2023-08-10 NOTE — LETTER
August 10, 2023      Silvia Cullen  [address]   [place] [address]        To Whom It May Concern:    Silvia Cullen was seen in our clinic. She may return to work if covid test is negative.  Currently, covid test is pending.      Sincerely,        MAXIMINO Stern

## 2023-08-11 ENCOUNTER — TELEPHONE (OUTPATIENT)
Dept: SCHEDULING | Facility: CLINIC | Age: 23
End: 2023-08-11
Payer: COMMERCIAL

## 2023-08-11 NOTE — TELEPHONE ENCOUNTER
Coronavirus (COVID-19) Notification    Caller Name (Patient, parent, daughter/son, grandparent, etc)  patient    Reason for call  Notify of Positive Coronavirus (COVID-19) lab results, assess symptoms,  review [facility] recommendations    Lab Result    Lab test:  2019-nCoV rRt-PCR or SARS-CoV-2 PCR    Oropharyngeal AND/OR nasopharyngeal swabs is POSITIVE for 2019-nCoV RNA/SARS-COV-2 PCR (COVID-19 virus)      Gather patient reported symptoms   Assessment   Current Symptoms at time of phone call, reported by patient: (if no symptoms, document: No symptoms] Cough, congestion, headache, fever of 101   Date of symptom(s) onset (if applicable) 8/8     If at time of call, Patients symptoms have worsened, the Patient should contact 911 or have someone drive them to Emergency Dept promptly:    If Patient calling 911, inform 911 personal that you have tested positive for the Coronavirus (COVID-19).  Place mask on and await 911 to arrive.  If Emergency Dept, If possible, please have another adult drive you to the Emergency Dept but you need to wear mask when in contact with other people.      Treatment Options:   Is patient interested in discussing COVID treatment? Yes.   Is this a [place] or [facility] Patient? No:     Are you an agent trained to scheduling? Yes. Ok, transfer to  to get in touch with RN per protocol       Review information with Patient    Your result was positive. This means you have COVID-19 (coronavirus).    How can I protect others?    These guidelines are for isolating before returning to work, school or .    If you DO have symptoms  Stay home and away from others   For at least 5 days after your symptoms started, AND  You are fever free for 24 hours (with no medicine that reduces fever), AND  Your other symptoms are better  Wear a mask for 10 full days anytime you are around others    If you DON'T have symptoms  Stay home and away from others for at least 5 days after your positive  test  Wear a mask for 10 full days anytime you are around others    There may be different guidelines for healthcare facilities.  Please check with the specific sites before arriving.    If you have been told by a doctor that you were severely ill with COVID-19 or are immunocompromised, you should isolate for at least 10 days.    You should not go back to work until you meet the guidelines above for ending your home isolation. You don't need to be retested for COVID-19 before going back to work--studies show that you won't spread the virus if it's been at least 10 days since your symptoms started (or 20 days, if you have a weak immune system).    Employers, schools, and daycares: This is an official notice for this person's medical guidelines for returning in-person.  They must meet the above guidelines before going back to work, school or  in person.    You will receive a positive COVID-19 letter via [employer] or the mail soon with additional self-care information.    Would you like me to review some of that information with you now?  No    If you were tested for an upcoming procedure, please contact your provider for next steps.    Melyssa Calvillo

## 2023-10-07 ENCOUNTER — OFFICE VISIT (OUTPATIENT)
Dept: URGENT CARE | Facility: URGENT CARE | Age: 23
End: 2023-10-07
Payer: COMMERCIAL

## 2023-10-07 VITALS
BODY MASS INDEX: 28.62 KG/M2 | HEART RATE: 76 BPM | WEIGHT: 211 LBS | DIASTOLIC BLOOD PRESSURE: 79 MMHG | SYSTOLIC BLOOD PRESSURE: 127 MMHG | OXYGEN SATURATION: 99 % | RESPIRATION RATE: 18 BRPM | TEMPERATURE: 97.8 F

## 2023-10-07 DIAGNOSIS — J02.9 SORE THROAT: Primary | ICD-10-CM

## 2023-10-07 DIAGNOSIS — J03.90 TONSILLITIS: ICD-10-CM

## 2023-10-07 DIAGNOSIS — H92.01 RIGHT EAR PAIN: ICD-10-CM

## 2023-10-07 DIAGNOSIS — R05.8 PRODUCTIVE COUGH: ICD-10-CM

## 2023-10-07 LAB
DEPRECATED S PYO AG THROAT QL EIA: NEGATIVE
GROUP A STREP BY PCR: NOT DETECTED

## 2023-10-07 PROCEDURE — 87651 STREP A DNA AMP PROBE: CPT | Performed by: FAMILY MEDICINE

## 2023-10-07 PROCEDURE — 99213 OFFICE O/P EST LOW 20 MIN: CPT | Performed by: FAMILY MEDICINE

## 2023-10-07 RX ORDER — AMOXICILLIN 500 MG/1
500 CAPSULE ORAL 2 TIMES DAILY
Qty: 14 CAPSULE | Refills: 0 | Status: SHIPPED | OUTPATIENT
Start: 2023-10-07 | End: 2023-10-14

## 2023-10-07 NOTE — PROGRESS NOTES
Patient presents with:  Urgent Care  Pharyngitis: Really sore thraot, loss voice.   Fever: X3 days. Symptoms x4 days.   Generalized Body Aches  Cough      Clinical Decision Making:      ICD-10-CM    1. Sore throat  J02.9 Streptococcus A Rapid Screen w/Reflex to PCR - Clinic Collect     Group A Streptococcus PCR Throat Swab     amoxicillin (AMOXIL) 500 MG capsule      2. Right ear pain  H92.01 amoxicillin (AMOXIL) 500 MG capsule      3. Productive cough  R05.8 amoxicillin (AMOXIL) 500 MG capsule      4. Tonsillitis  J03.90 amoxicillin (AMOXIL) 500 MG capsule          There are no Patient Instructions on file for this visit.    HPI:  Silvia Cullen is a 23 year old female who presents today complaining of   Chief Complaint   Patient presents with    Urgent Care    Pharyngitis     Really sore thraot, loss voice.     Fever     X3 days. Symptoms x4 days.     Generalized Body Aches    Cough         History obtained from the patient.    Problem List:  2022-06: Hypermobility arthralgia  2019-03: Other acne  2018-04: Mild mitral regurgitation  2018-04: Upper back pain  2018-04: Uses oral contraception  2018-04: Abdominal pain, epigastric  2016-12: Acne      Past Medical History:   Diagnosis Date    Hypermobility arthralgia        Social History     Tobacco Use    Smoking status: Never    Smokeless tobacco: Never   Substance Use Topics    Alcohol use: Yes     Comment: rare, occasion       Review of Systems  Constitutional, HEENT, cardiovascular, pulmonary, gi and gu systems are negative, except as otherwise noted.    Vitals:    10/07/23 1350   BP: 127/79   Pulse: 76   Resp: 18   Temp: 97.8  F (36.6  C)   TempSrc: Temporal   SpO2: 99%   Weight: 95.7 kg (211 lb)       Physical Exam  GENERAL: healthy, alert and no distress  HEENT: voice hoarseness, posterior oropharynx appearing erythematous and edematous with 2+ tonsillar enlargement and purulent discharge, Tms normal,   NECK: no adenopathy, no asymmetry, masses, or scars and  thyroid normal to palpation  RESP: lungs clear to auscultation - no rales, rhonchi or wheezes  CV: regular rate and rhythm, normal S1 S2, no S3 or S4, no murmur, click or rub,  MS: no gross musculoskeletal defects noted, no edema  NEURO: Normal strength and tone, mentation intact and speech normal  PSYCH: mentation appears normal, affect normal/bright      Results:  Results for orders placed or performed in visit on 10/07/23   Streptococcus A Rapid Screen w/Reflex to PCR - Clinic Collect     Status: Normal    Specimen: Throat; Swab   Result Value Ref [facility]    Group A Strep antigen Negative Negative         At the end of the encounter, I discussed results, diagnosis, medications. Discussed red flags for immediate return to clinic/ER, as well as indications for follow up if no improvement. Patient understood and agreed to plan. Patient was stable for discharge.

## 2024-02-18 ENCOUNTER — HEALTH MAINTENANCE LETTER (OUTPATIENT)
Age: 24
End: 2024-02-18

## 2025-03-09 ENCOUNTER — HEALTH MAINTENANCE LETTER (OUTPATIENT)
Age: 25
End: 2025-03-09